# Patient Record
Sex: FEMALE | Race: BLACK OR AFRICAN AMERICAN | Employment: UNEMPLOYED | URBAN - METROPOLITAN AREA
[De-identification: names, ages, dates, MRNs, and addresses within clinical notes are randomized per-mention and may not be internally consistent; named-entity substitution may affect disease eponyms.]

---

## 2018-07-02 ENCOUNTER — HOSPITAL ENCOUNTER (OUTPATIENT)
Dept: PHYSICAL THERAPY | Age: 63
Discharge: HOME | End: 2018-07-02
Payer: MEDICAID

## 2018-07-02 PROCEDURE — 97535 SELF CARE MNGMENT TRAINING: CPT

## 2018-07-02 PROCEDURE — 97110 THERAPEUTIC EXERCISES: CPT

## 2018-07-02 PROCEDURE — 97161 PT EVAL LOW COMPLEX 20 MIN: CPT
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
## 2018-07-02 NOTE — PROGRESS NOTES
PHYSICAL THERAPY - DAILY TREATMENT NOTE    Patient Name: Karli Easton        Date: 2018  : 1955   YES Patient  Verified  Visit #:     Insurance: Payor: /      In time: 3:01 Out time: 3:50   Total Treatment Time: 52     Medicare Time Tracking (below)   Total Timed Codes (min):  na 1:1 Treatment Time:  na     TREATMENT AREA =  Right hip    SUBJECTIVE    Pain Level (on 0 to 10 scale):  10  / 10   Medication Changes/New allergies or changes in medical history, any new surgeries or procedures? NO    If yes, update Summary List   Subjective Functional Status/Changes:  []  No changes reported     See POC          OBJECTIVE    10 min Therapeutic Exercise:  [x]  See flow sheet   Rationale:      increase ROM, increase strength and improve balance to improve the patients ability to amb/ community. 10 min Self Care: Instructed in footwear, pool regulations and body's response to activity   Rationale:       reduce pain to improve the patients ability to amb/ community. Other Objective/Functional Measures:    Shown and performed HEP     Post Treatment Pain Level (on 0 to 10) scale:   10 / 10     ASSESSMENT  Assessment/Changes in Function:     See POC     []  See Progress Note/Recertification   Patient will continue to benefit from skilled PT services to modify and progress therapeutic interventions, address functional mobility deficits, address ROM deficits, address strength deficits, analyze and address soft tissue restrictions, analyze and cue movement patterns and analyze and modify body mechanics/ergonomics to attain goals per POC.    Progress toward goals / Updated goals:    See POC     PLAN  [x]  Upgrade activities as tolerated YES Continue plan of care   []  Discharge due to :    [x]  Other: Aquatic therapy to start     Therapist: Logan Whiting PT, DPT, OCS, CSCS    Date: 2018 Time: 3:02 PM

## 2018-07-17 ENCOUNTER — HOSPITAL ENCOUNTER (OUTPATIENT)
Dept: PHYSICAL THERAPY | Age: 63
End: 2018-07-17
Payer: MEDICAID

## 2018-07-24 ENCOUNTER — APPOINTMENT (OUTPATIENT)
Dept: PHYSICAL THERAPY | Age: 63
End: 2018-07-24
Payer: MEDICAID

## 2018-07-25 ENCOUNTER — APPOINTMENT (OUTPATIENT)
Dept: PHYSICAL THERAPY | Age: 63
End: 2018-07-25
Payer: MEDICAID

## 2018-07-26 ENCOUNTER — HOSPITAL ENCOUNTER (OUTPATIENT)
Dept: PHYSICAL THERAPY | Age: 63
Discharge: HOME | End: 2018-07-26
Payer: MEDICAID

## 2018-07-26 PROCEDURE — 97113 AQUATIC THERAPY/EXERCISES: CPT

## 2018-07-26 NOTE — PROGRESS NOTES
PHYSICAL THERAPY - DAILY TREATMENT NOTE - AQUATIC THERAPY    Patient Name: Jim Rizo        Date: 2018  : 1955   YES Patient  Verified  Visit #:     Insurance: Payor: [employer] / Plan: [facility] COMMUN[employer] PLAN [employer]P / Product Type: Managed Care Medicaid /      In time: 200 Out time: 230   Total Treatment Time: 30     Medicare Time Tracking (below)   Total Timed Codes (min):   1:1 Treatment Time:       TREATMENT AREA =  Right hip    SUBJECTIVE    Pain Level (on 0 to 10 scale):  7-10  / 10   Medication Changes/New allergies or changes in medical history, any new surgeries or procedures? NO If yes, update Summary List   Subjective Functional Status/Changes:  []  No changes reported     The pain is always constant and I can't sleep well at all during the night. I get most of my rest during the day time. OBJECTIVE      30 min Aquatic Therapy:  [x]  See flow sheet   Rationale:      increase ROM, increase strength, improve coordination and improve balance to improve the patients ability to perform functional mobility activities with decrease c/o symptoms. min Patient Education:  YES  Reviewed HEP   []  Progressed/Changed HEP based on: Other Objective/Functional Measures:    No change in functional measurements today. Post Treatment Pain Level (on 0 to 10) scale:   6   10     ASSESSMENT  Assessment/Changes in Function:     Overall good tolerance to all therx in pool for first treatment session  Although patient had marked \"no\" to fear of water patient demonstrates apprehension to being in the water and states that she does. tI took patient extended time to be able to move in the water. Extra time was need to perform all therx and walking in pool.      []  See Progress Note/Recertification   Patient will continue to benefit from skilled PT services to modify and progress therapeutic interventions, address functional mobility deficits, address ROM deficits, address strength deficits and analyze and cue movement patterns to attain remaining goals. Progress toward goals / Updated goals:    No change toward goals today.      PLAN  []  Upgrade activities as tolerated YES Continue plan of care   []  Discharge due to :    []  Other:      Therapist: Leatha Cole PTA    Date: 7/26/2018 Time: 7:11 AM       Future Appointments  Date Time Provider Marina Brannon   7/26/2018 2:00 PM Alysia Aguila [facility]   7/30/2018 2:30 PM Leatha Cole PTA [facility]   8/1/2018 3:00 PM Leatha Cole PTA [facility]   8/6/2018 3:00 PM Leatha Cole PTA [facility]   8/8/2018 3:00 PM Leatha Cole [facility]

## 2018-07-26 NOTE — PROGRESS NOTES
[address] [place] [address], [address] [place] [address] - Phone: [phone]  Fax: [phone]   PROGRESS NOTE    Patient Name: Jim Rizo : 1955   Treatment/Medical  Diagnosis: Right hip pain [M25.551]     Referral Source: Daina Pérez MD     Date of Initial Visit: 2018 Attended Visits: 3 Missed Visits: 0     SUMMARY OF TREATMENT  Jim Rizo has been seen at our clinic for a total of 3 visits. Pt treatment has consisted of  aquatic therapy    CURRENT STATUS  Pt has had a good tolerance to physical therapy treatment. Limited progression has been made secondary to scheduling conflicts. Goal/Measure of Progress Goal Met? 1. Decrease max pain 50-75% to assist with amb in community with cane >10 min. Status at last Eval: rates pain as 10/10 max, 7/10 at best, Current Status: 7-10/10 Slow progression   2. Improve FOTO Functional Status Score by 23 points in order to show significant functional improvement. Status at last Eval: 30 Current Status: 17 No: decrease of 13 points   3. Will rate a +5 on Global Rating of Change and be prepared to DC to HEP. Status at last Eval:  Current Status: +1 a tiny bit better, almost the same Slow progression     New Goals to be achieved in __4__  weeks:  1. Continue with above 3 goals     RECOMMENDATIONS  We would like to continue with aquatic therapy to progress patient further toward goals. Please advise. Thank you. If you have any questions/comments please contact us directly at [phone]. Thank you for allowing us to assist in the care of your patient.       LPTA Signature: Tay Sherman PTA Date: 2018   PT Signature: Anjum Yoo PT, DPT, OCS, CSCS Time: 1:51 PM     NOTE TO PHYSICIAN:  PLEASE COMPLETE THE ORDERS BELOW AND FAX TO   [facility]: [phone]  If you are unable to process this request in 24 hours please contact our office: [phone]    ___ I have read the above report and request that my patient continue as recommended.   ___ I have read the above report and request that my patient continue therapy with the following changes/special instructions:_________________________________________________________   ___ I have read the above report and request that my patient be discharged from therapy.      Physician Signature:        Date:       Time:

## 2018-07-30 ENCOUNTER — APPOINTMENT (OUTPATIENT)
Dept: PHYSICAL THERAPY | Age: 63
End: 2018-07-30
Payer: MEDICAID

## 2018-07-30 ENCOUNTER — HOSPITAL ENCOUNTER (OUTPATIENT)
Dept: PHYSICAL THERAPY | Age: 63
Discharge: HOME | End: 2018-07-30
Payer: MEDICAID

## 2018-07-30 PROCEDURE — 97113 AQUATIC THERAPY/EXERCISES: CPT

## 2018-07-30 NOTE — PROGRESS NOTES
PHYSICAL THERAPY - DAILY TREATMENT NOTE - AQUATIC THERAPY    Patient Name: Arminda         Date: 2018  : 1955   YES Patient  Verified  Visit #:   3   of   18  Insurance: Payor: Jonna Murguia / Plan: [address] [employer]P / Product Type: Managed Care Medicaid /      In time: 225 Out time: 255   Total Treatment Time: 30     Medicare Time Tracking (below)   Total Timed Codes (min):   1:1 Treatment Time:       TREATMENT AREA =  Right hip    SUBJECTIVE    Pain Level (on 0 to 10 scale):  8  / 10   Medication Changes/New allergies or changes in medical history, any new surgeries or procedures? NO If yes, update Summary List   Subjective Functional Status/Changes:  []  No changes reported     I feel on the floor this past weekend, but it was on the carpet and I didn't hurt myself. OBJECTIVE      30 min Aquatic Therapy:  [x]  See flow sheet   Rationale:      increase ROM, increase strength, improve coordination and improve balance to improve the patients ability to perform functional mobility activities with decrease c/o symptoms. min Patient Education:  YES  Reviewed HEP   []  Progressed/Changed HEP based on: Other Objective/Functional Measures: FOTO: 17 vs 30 on IE  GROC +1 a tiny bit better, almost the same. Post Treatment Pain Level (on 0 to 10) scale:   8  / 10     ASSESSMENT  Assessment/Changes in Function:     See PN       []  See Progress Note/Recertification   Patient will continue to benefit from skilled PT services to modify and progress therapeutic interventions, address functional mobility deficits, address ROM deficits, address strength deficits and analyze and cue movement patterns to attain remaining goals. Progress toward goals / Updated goals:    See PN.      PLAN  []  Upgrade activities as tolerated YES Continue plan of care   []  Discharge due to :    []  Other:      Therapist: Dona Lieberman PTA    Date: 2018 Time: 7:27 AM       Future Appointments  Date Time Provider Marina Brannon   7/30/2018 2:30 PM Rere Angel [facility]   8/1/2018 3:00 PM Amber Angel [facility]   8/6/2018 3:00 PM Amber Angel [facility]   8/8/2018 3:00 PM Amber Angel [facility]

## 2018-08-01 ENCOUNTER — APPOINTMENT (OUTPATIENT)
Dept: PHYSICAL THERAPY | Age: 63
End: 2018-08-01
Payer: MEDICAID

## 2018-08-06 ENCOUNTER — HOSPITAL ENCOUNTER (OUTPATIENT)
Dept: PHYSICAL THERAPY | Age: 63
End: 2018-08-06
Payer: MEDICAID

## 2018-08-06 ENCOUNTER — APPOINTMENT (OUTPATIENT)
Dept: PHYSICAL THERAPY | Age: 63
End: 2018-08-06
Payer: MEDICAID

## 2018-08-08 ENCOUNTER — APPOINTMENT (OUTPATIENT)
Dept: PHYSICAL THERAPY | Age: 63
End: 2018-08-08
Payer: MEDICAID

## 2018-08-08 ENCOUNTER — HOSPITAL ENCOUNTER (OUTPATIENT)
Dept: PHYSICAL THERAPY | Age: 63
Discharge: HOME | End: 2018-08-08
Payer: MEDICAID

## 2018-08-08 PROCEDURE — 97113 AQUATIC THERAPY/EXERCISES: CPT

## 2018-08-08 NOTE — PROGRESS NOTES
PHYSICAL THERAPY - DAILY TREATMENT NOTE - AQUATIC THERAPY    Patient Name: Karli Easton        Date: 2018  : 1955   YES Patient  Verified  Visit #:     Insurance: Payor: Juanjo Plan / Plan: [address] PLAN [employer]P / Product Type: Managed Care Medicaid /      In time: 300 Out time: 330   Total Treatment Time: 30     Medicare Time Tracking (below)   Total Timed Codes (min):   1:1 Treatment Time:       TREATMENT AREA =  Right hip    SUBJECTIVE    Pain Level (on 0 to 10 scale):  10  / 10   Medication Changes/New allergies or changes in medical history, any new surgeries or procedures? NO If yes, update Summary List   Subjective Functional Status/Changes:  []  No changes reported     I got a dr appointment on the  with dr Aretha Pelaez. The pain has just been bad. I been tryin to do what I can with exercises. OBJECTIVE      30 min Aquatic Therapy:  [x]  See flow sheet   Rationale:      increase ROM, increase strength, improve coordination and improve balance to improve the patients ability to perform functional mobility activities with decrease c/o symptoms.       min Patient Education:  YES  Reviewed HEP   []  Progressed/Changed HEP based on: Other Objective/Functional Measures:    Unable to perform sl balance ro step up secondary to increase hip pain today. Add leg circles for ROM. Post Treatment Pain Level (on 0 to 10) scale:  8 / 10     ASSESSMENT  Assessment/Changes in Function:     Patient demonstrates a significantly compromised gait today. Reports significant increase of hip pain and unable to place full weight onto (R) side secondary to causing an increase of pain.      []  See Progress Note/Recertification   Patient will continue to benefit from skilled PT services to modify and progress therapeutic interventions, address functional mobility deficits, address ROM deficits, address strength deficits and analyze and cue movement patterns to attain remaining goals.   Progress toward goals / Updated goals:    Recommendation has been made for patient to return to MD at this time secondary to having no relief of symptoms.        PLAN  []  Upgrade activities as tolerated YES Continue plan of care   []  Discharge due to :    []  Other:      Therapist: Jeff Ricci PTA    Date: 8/8/2018 Time: 6:59 AM       Future Appointments  Date Time Provider Marina Brannon   8/8/2018 3:00 PM Jeff Ricci PTA [address]

## 2018-08-09 NOTE — PROGRESS NOTES
[address] [place] [address], [address] [place] [address] - Phone: [phone]  Fax: [phone]   DISCHARGE NOTE    Patient Name: Sameer Jean : 1955   Treatment/Medical  Diagnosis: Right hip pain [M25.551]     Referral Source: Jazmyn Posada MD     Date of Initial Visit: 2018 Attended Visits: 4 Missed Visits: 0     SUMMARY OF TREATMENT  Sameer Jean has been seen at our clinic for a total of 4 visits. Pt treatment has consisted of  aquatic therapy    CURRENT STATUS  Pt has had fair tolerance to physical therapy treatment. Patient continued to present with significant pain in (R) hip (8-10/10). Patient also demonstrated a significant loss of her ability to ambulate with a normal gait pattern secondary to increase pain with weight bearing through (R) LE. Therapeutic exercises in the pool was giving no apparent relief of pain and there was little to no carry over to relieve her symptoms. Patient was also unable to perform HEP secondary to increase pain reported when she would attempt to perform them. Patient has been advised to return to MD for follow up and for further consultation. Goal/Measure of Progress Goal Met? 1. Decrease max pain 50-75% to assist with amb in community with cane >10 min. Status at last Eval: rates pain as 10/10 max, 7/10 at best, Current Status: 7-10/10 Slow progression   2. Improve FOTO Functional Status Score by 23 points in order to show significant functional improvement. Status at last Eval: 30 Current Status: 17 No: decrease of 13 points   3. Will rate a +5 on Global Rating of Change and be prepared to DC to HEP. Status at last Eval:  Current Status: +1 a tiny bit better, almost the same Slow progression       RECOMMENDATIONS  Patient to be discharged due to minimal progress and poor tolerance and to return to MD for further consultation. Thank you.     If you have any questions/comments please contact us directly at [phone]. Thank you for allowing us to assist in the care of your patient. LPTA Signature: Noe Henry PTA Date: 8/9/2018   PT Signature: Rm Winn PT, DPT, OCS, CSCS Time: 6:50 am     NOTE TO PHYSICIAN:  PLEASE COMPLETE THE ORDERS BELOW AND FAX TO   [facility]: [phone]  If you are unable to process this request in 24 hours please contact our office: [phone]    ___ I have read the above report and request that my patient continue as recommended.   ___ I have read the above report and request that my patient continue therapy with the following changes/special instructions:_________________________________________________________   ___ I have read the above report and request that my patient be discharged from therapy.      Physician Signature:        Date:       Time:

## 2019-08-15 ENCOUNTER — OFFICE VISIT (OUTPATIENT)
Dept: ORTHOPEDIC SURGERY | Facility: CLINIC | Age: 64
End: 2019-08-15

## 2019-08-15 VITALS
WEIGHT: 203.4 LBS | TEMPERATURE: 97 F | SYSTOLIC BLOOD PRESSURE: 133 MMHG | HEIGHT: 68 IN | RESPIRATION RATE: 18 BRPM | OXYGEN SATURATION: 97 % | HEART RATE: 95 BPM | DIASTOLIC BLOOD PRESSURE: 81 MMHG | BODY MASS INDEX: 30.83 KG/M2

## 2019-08-15 DIAGNOSIS — M25.551 RIGHT HIP PAIN: ICD-10-CM

## 2019-08-15 DIAGNOSIS — M19.90 INFLAMMATORY ARTHROPATHY: ICD-10-CM

## 2019-08-15 DIAGNOSIS — M16.11 PRIMARY OSTEOARTHRITIS OF RIGHT HIP: ICD-10-CM

## 2019-08-15 DIAGNOSIS — M16.0 PRIMARY OSTEOARTHRITIS OF BOTH HIPS: Primary | ICD-10-CM

## 2019-08-15 DIAGNOSIS — M17.12 PATELLOFEMORAL ARTHRITIS OF LEFT KNEE: ICD-10-CM

## 2019-08-15 RX ORDER — AMLODIPINE BESYLATE 5 MG/1
5 TABLET ORAL DAILY
COMMUNITY

## 2019-08-15 RX ORDER — LEVOTHYROXINE SODIUM 150 UG/1
150 TABLET ORAL
COMMUNITY
Start: 2019-07-22

## 2019-08-15 RX ORDER — DICLOFENAC SODIUM 75 MG/1
TABLET, DELAYED RELEASE ORAL
COMMUNITY
Start: 2019-08-09 | End: 2020-02-06

## 2019-08-15 RX ORDER — GLUCOSAM/CHONDRO/HERB 149/HYAL 750-100 MG
1 TABLET ORAL DAILY
COMMUNITY
End: 2020-02-06

## 2019-08-15 RX ORDER — LOSARTAN POTASSIUM AND HYDROCHLOROTHIAZIDE 12.5; 1 MG/1; MG/1
1 TABLET ORAL DAILY
COMMUNITY
Start: 2019-08-09

## 2019-08-15 RX ORDER — GLUCOSAMINE/CHONDR SU A SOD 750-600 MG
TABLET ORAL
COMMUNITY

## 2019-08-15 NOTE — PROGRESS NOTES
Patient: Davis Hernández                MRN: [id number]       SSN: [id number]  YOB: 1955        AGE: 66 y.o. SEX: female  Body mass index is 30.93 kg/m². PCP: Unknown, Provider  08/15/19    HISTORY:  Thank you so much for having me see the patient for opinion and advice in regard to her hips. She is a very nice lady who has very severe end-stage arthritis involving the hips, associated with some dysplasia. The right is much worse than the left. They have been bad for about 10 years. She was looking after a family member for the last few years and she is interested in possible surgery. The pain is moderate and aching. She has difficulty putting shoes and socks on. She is on a walker full time. She has difficulty getting in and out of the car. Activities of daily living are very restricted, and has a less than a 5 minute level walking tolerance. She denies fevers, chills, night sweats or weight loss. She does have some morning stiffness. A 12-point review of systems was performed today and all other systems are reviewed and are otherwise negative. PHYSICAL EXAMINATION:  On examination, she walks poorly. She also has arthritis involving the left knee. She is in varus. She has an antalgic and Trendelenburg gait bilateral, worse on the right than on the left. The left hip has about 10 degrees of internal rotation, and within that range is not too sore. The right hip really has no internal rotation, and, in fact, has a couple degree of external rotation contracture. It is very stiff which reproduces her groin and thigh discomfort. Sensation is grossly intact. Just slight neuropathy distally. No foot drop. Tibialis anterior and EHL are 5/5. Calves nontender. Homans sign is negative. No peripheral edema, cyanosis or clubbing. RADIOGRAPHS:   Review of the x-rays confirm very severe end-stage arthritis, especially of the right hip with some dysplasia.       PLAN: We had a lengthy discussion regarding risks and benefits involving total joint replacement. They are going to think about things. I would be very happy to reconstruct them. Complications   including, but not limited to, infection, DVT, pulmonary embolism, anesthetic complications, blood loss requiring transfusion, leg length discrepancy, the right leg will be longer temporarily and will eventually get the left hip repaired to even things out, dislocation, implant longevity. We also discussed material selections as well. It has been a pleasure to share in her care. They are a very nice family. We will see her back in a couple weeks' time. I would like to get some rheumatological labs. She does have a fair bit of arthritis at the MCP joints as well. CC:   Family Medicine           Dr. Minna Goltz (sp)         REVIEW OF SYSTEMS:      CON: negative for weight loss, fever  EYE: negative for double vision  ENT: negative for hoarseness  RS:   negative for Tb  GI:    negative for blood in stool  :  negative for blood in urine  Other systems reviewed and noted below. Past Medical History:   Diagnosis Date    Arthritis     Hypertension     Thyroid disease        History reviewed. No pertinent family history. Current Outpatient Medications   Medication Sig Dispense Refill    diclofenac EC (VOLTAREN) 75 mg EC tablet       levothyroxine (SYNTHROID) 150 mcg tablet       losartan-hydroCHLOROthiazide (HYZAAR) 100-12.5 mg per tablet       amLODIPine (NORVASC) 5 mg tablet Take 5 mg by mouth daily.  Biotin 2,500 mcg cap Take  by mouth.  omega 3-DHA-EPA-fish oil 1,000 mg (120 mg-180 mg) capsule Take 1 Cap by mouth daily. Allergies   Allergen Reactions    Aspirin Nausea and Vomiting    Other Medication Not Reported This Time     Spiam       History reviewed. No pertinent surgical history.     Social History     Socioeconomic History    Marital status: UNKNOWN     Spouse name: Not on file  Number of children: Not on file    Years of education: Not on file    Highest education level: Not on file   Occupational History    Not on file   Social Needs    Financial resource strain: Not on file    Food insecurity:     Worry: Not on file     Inability: Not on file    Transportation needs:     Medical: Not on file     Non-medical: Not on file   Tobacco Use    Smoking status: Never Smoker    Smokeless tobacco: Never Used   Substance and Sexual Activity    Alcohol use: Not Currently    Drug use: Never    Sexual activity: Not on file   Lifestyle    Physical activity:     Days per week: Not on file     Minutes per session: Not on file    Stress: Not on file   Relationships    Social connections:     Talks on phone: Not on file     Gets together: Not on file     Attends Yazidi service: Not on file     Active member of club or organization: Not on file     Attends meetings of clubs or organizations: Not on file     Relationship status: Not on file    Intimate partner violence:     Fear of current or ex partner: Not on file     Emotionally abused: Not on file     Physically abused: Not on file     Forced sexual activity: Not on file   Other Topics Concern    Not on file   Social History Narrative    Not on file       Visit Vitals  /81   Pulse 95   Temp 97 °F (36.1 °C) (Oral)   Resp 18   Ht 5' 8\" (1.727 m)   Wt 203 lb 6.4 oz (92.3 kg)   SpO2 97%   BMI 30.93 kg/m²         PHYSICAL EXAMINATION:  GENERAL: Alert and oriented x3, in no acute distress, well-developed, well-nourished, afebrile. HEART: No JVD. EYES: No scleral icterus   NECK: No significant lymphadenopathy   LUNGS: No respiratory compromise or indrawing  ABDOMEN: Soft, non-tender, non-distended. Electronically signed by:  Lucia Martins MD

## 2019-08-29 DIAGNOSIS — M19.90 INFLAMMATORY ARTHROPATHY: ICD-10-CM

## 2019-09-11 DIAGNOSIS — M19.90 INFLAMMATORY ARTHROPATHY: ICD-10-CM

## 2019-09-18 ENCOUNTER — OFFICE VISIT (OUTPATIENT)
Dept: ORTHOPEDIC SURGERY | Facility: CLINIC | Age: 64
End: 2019-09-18

## 2019-09-18 VITALS
SYSTOLIC BLOOD PRESSURE: 130 MMHG | HEIGHT: 68 IN | BODY MASS INDEX: 31.07 KG/M2 | TEMPERATURE: 96.3 F | OXYGEN SATURATION: 98 % | WEIGHT: 205 LBS | RESPIRATION RATE: 16 BRPM | DIASTOLIC BLOOD PRESSURE: 88 MMHG | HEART RATE: 85 BPM

## 2019-09-18 DIAGNOSIS — M16.12 PRIMARY OSTEOARTHRITIS OF LEFT HIP: ICD-10-CM

## 2019-09-18 DIAGNOSIS — M16.31 UNILATERAL OSTEOARTHRITIS RESULTING FROM HIP DYSPLASIA, RIGHT HIP: Primary | ICD-10-CM

## 2019-09-18 DIAGNOSIS — R70.0 ELEVATED SED RATE: ICD-10-CM

## 2019-09-18 NOTE — PROGRESS NOTES
Patient: Shannen James                MRN: [id number]       SSN: [id number]  YOB: 1955        AGE: 66 y.o. SEX: female  Body mass index is 31.17 kg/m². PCP: Unknown, Provider  09/18/19    HISTORY:  I had the pleasure of reviewing the patient with her son today with regard to severe arthritis of both hips, worse on the right than on the left. She has significant dysplasia and we will plan on having a modular implant for this. She is fed up and frustrated with it. The pain is significant. She does get some morning stiffness. We did do some rheumatological labs. The Lyme disease is negative. We also tested for OSMEL and rheumatoid factors and all were within normal limits. CRP is negative, however, her sed rate is quite elevated at 82. I will get a rheumatological opinion. She may have seronegative arthropathy. I will get an opinion from [facility] and Dr. Mookie Garcia as well. She is really fed up and frustrated with it and would like to have her hip replaced. We had discussed less invasive surgery versus standard _______________ surgery, and I think, given her size and her significant dysplasia we should plan on a more formal approach. I think she will do well. We are planning on an overnight stay in the hospital, at most.  We had a lengthy discussion regarding risks and benefits involving surgery, including, but not limited to, infection, DVT, pulmonary embolism, anesthetic complications, blood loss requiring transfusion, leg length discrepancy because she needs her other hip done, dislocation, and I think we may plan on using a modular implant because of her significant dysplasia of the right hip. On the 12-point review of systems which is positive for morning stiffness. She is otherwise a fairly healthy lady. All pertinent positives noted. All other systems are reviewed and are negative.       PHYSICAL EXAMINATION:  On examination today, she has only a couple of degrees of internal rotation, especially of the right hip. The left hip is similar but less severe. Calf is nontender. Homans sign is negative. Mild leg length discrepancy. No foot drop. Tibialis anterior and EHL 5/5. RADIOGRAPHS:   Review of the x-rays confirm end-stage arthritis, really of both hips with significant dysplasia on the right side. PLAN:  I would be very happy to replace the hip for her. We had a lengthy discussion regarding all of the risks and benefits. I think she will do nicely. She has been limping for a long time so I would expect some muscle weakness after surgery. Having said this, I think she will do very well. We will plan on extended therapy for her. CC:  Family Medicine        REVIEW OF SYSTEMS:      CON: negative for weight loss, fever  EYE: negative for double vision  ENT: negative for hoarseness  RS:   negative for Tb  GI:    negative for blood in stool  :  negative for blood in urine  Other systems reviewed and noted below. Past Medical History:   Diagnosis Date    Arthritis     Hypertension     Thyroid disease        History reviewed. No pertinent family history. Current Outpatient Medications   Medication Sig Dispense Refill    diclofenac EC (VOLTAREN) 75 mg EC tablet       levothyroxine (SYNTHROID) 150 mcg tablet       losartan-hydroCHLOROthiazide (HYZAAR) 100-12.5 mg per tablet       amLODIPine (NORVASC) 5 mg tablet Take 5 mg by mouth daily.  Biotin 2,500 mcg cap Take  by mouth.  omega 3-DHA-EPA-fish oil 1,000 mg (120 mg-180 mg) capsule Take 1 Cap by mouth daily. Allergies   Allergen Reactions    Aspirin Nausea and Vomiting    Other Medication Not Reported This Time     Spiam       History reviewed. No pertinent surgical history.     Social History     Socioeconomic History    Marital status: UNKNOWN     Spouse name: Not on file    Number of children: Not on file    Years of education: Not on file    Highest education level: Not on file   Occupational History    Not on file   Social Needs    Financial resource strain: Not on file    Food insecurity:     Worry: Not on file     Inability: Not on file    Transportation needs:     Medical: Not on file     Non-medical: Not on file   Tobacco Use    Smoking status: Never Smoker    Smokeless tobacco: Never Used   Substance and Sexual Activity    Alcohol use: Not Currently    Drug use: Never    Sexual activity: Not on file   Lifestyle    Physical activity:     Days per week: Not on file     Minutes per session: Not on file    Stress: Not on file   Relationships    Social connections:     Talks on phone: Not on file     Gets together: Not on file     Attends Sabianism service: Not on file     Active member of club or organization: Not on file     Attends meetings of clubs or organizations: Not on file     Relationship status: Not on file    Intimate partner violence:     Fear of current or ex partner: Not on file     Emotionally abused: Not on file     Physically abused: Not on file     Forced sexual activity: Not on file   Other Topics Concern    Not on file   Social History Narrative    Not on file       Visit Vitals  /88 (BP 1 Location: Left arm, BP Patient Position: Sitting)   Pulse 85   Temp 96.3 °F (35.7 °C) (Oral)   Resp 16   Ht 5' 8\" (1.727 m)   Wt 205 lb (93 kg)   SpO2 98%   BMI 31.17 kg/m²         PHYSICAL EXAMINATION:  GENERAL: Alert and oriented x3, in no acute distress, well-developed, well-nourished, afebrile. HEART: No JVD. EYES: No scleral icterus   NECK: No significant lymphadenopathy   LUNGS: No respiratory compromise or indrawing  ABDOMEN: Soft, non-tender, non-distended. Electronically signed by:  Mychal Olmedo MD

## 2019-09-18 NOTE — PROGRESS NOTES
1. Have you been to the ER, urgent care clinic since your last visit? Hospitalized since your last visit? NO    2. Have you seen or consulted any other health care providers outside of the [address] [place] [address] since your last visit? Include any pap smears or colon screening.  NO

## 2019-10-17 DIAGNOSIS — M19.90 INFLAMMATORY ARTHROPATHY: ICD-10-CM

## 2019-12-27 ENCOUNTER — TELEPHONE (OUTPATIENT)
Dept: ORTHOPEDIC SURGERY | Age: 64
End: 2019-12-27

## 2019-12-27 DIAGNOSIS — M16.11 PRIMARY OSTEOARTHRITIS OF RIGHT HIP: Primary | ICD-10-CM

## 2019-12-27 DIAGNOSIS — Z01.818 PRE-OP EXAM: ICD-10-CM

## 2019-12-27 NOTE — TELEPHONE ENCOUNTER
Patient to have Rt ALFREDO on 1/31/20. States she has all DME needed except for 3-in-1-commode. Please put in order and Shirin Colmenares will fax out to [facility].

## 2020-01-17 DIAGNOSIS — M16.11 PRIMARY OSTEOARTHRITIS OF RIGHT HIP: ICD-10-CM

## 2020-01-17 DIAGNOSIS — Z01.818 PREOP EXAMINATION: Primary | ICD-10-CM

## 2020-01-20 ENCOUNTER — HOSPITAL ENCOUNTER (OUTPATIENT)
Dept: PREADMISSION TESTING | Age: 65
Discharge: HOME | End: 2020-01-20
Payer: MEDICAID

## 2020-01-20 ENCOUNTER — HOSPITAL ENCOUNTER (OUTPATIENT)
Dept: GENERAL RADIOLOGY | Age: 65
Discharge: HOME | End: 2020-01-20
Payer: MEDICAID

## 2020-01-20 DIAGNOSIS — Z01.818 PREOP EXAMINATION: ICD-10-CM

## 2020-01-20 DIAGNOSIS — M16.11 PRIMARY OSTEOARTHRITIS OF RIGHT HIP: ICD-10-CM

## 2020-01-20 LAB
ABO + RH BLD: NORMAL
ALBUMIN SERPL-MCNC: 3.9 G/DL (ref 3.4–5)
ANION GAP SERPL CALC-SCNC: 5 MMOL/L (ref 3–18)
APPEARANCE UR: NORMAL
APTT PPP: 33.9 SEC (ref 23–36.4)
ATRIAL RATE: 92 BPM
BASOPHILS # BLD: 0 K/UL (ref 0–0.1)
BASOPHILS NFR BLD: 1 % (ref 0–2)
BILIRUB UR QL: NEGATIVE
BLOOD GROUP ANTIBODIES SERPL: NORMAL
BUN SERPL-MCNC: 16 MG/DL (ref 7–18)
BUN/CREAT SERPL: 16 (ref 12–20)
CALCIUM SERPL-MCNC: 9.4 MG/DL (ref 8.5–10.1)
CALCULATED P AXIS, ECG09: 25 DEGREES
CALCULATED R AXIS, ECG10: 24 DEGREES
CALCULATED T AXIS, ECG11: 68 DEGREES
CHLORIDE SERPL-SCNC: 106 MMOL/L (ref 100–111)
CO2 SERPL-SCNC: 31 MMOL/L (ref 21–32)
COLOR UR: YELLOW
CREAT SERPL-MCNC: 0.98 MG/DL (ref 0.6–1.3)
DIAGNOSIS, 93000: NORMAL
DIFFERENTIAL METHOD BLD: ABNORMAL
EOSINOPHIL # BLD: 0.2 K/UL (ref 0–0.4)
EOSINOPHIL NFR BLD: 3 % (ref 0–5)
ERYTHROCYTE [DISTWIDTH] IN BLOOD BY AUTOMATED COUNT: 13.5 % (ref 11.6–14.5)
EST. AVERAGE GLUCOSE BLD GHB EST-MCNC: 117 MG/DL
GLUCOSE SERPL-MCNC: 95 MG/DL (ref 74–99)
GLUCOSE UR STRIP.AUTO-MCNC: NEGATIVE MG/DL
HBA1C MFR BLD: 5.7 % (ref 4.2–5.6)
HCT VFR BLD AUTO: 38.7 % (ref 35–45)
HGB BLD-MCNC: 13.2 G/DL (ref 12–16)
HGB UR QL STRIP: NEGATIVE
INR PPP: 0.9 (ref 0.8–1.2)
KETONES UR QL STRIP.AUTO: NEGATIVE MG/DL
LEUKOCYTE ESTERASE UR QL STRIP.AUTO: NEGATIVE
LYMPHOCYTES # BLD: 1.6 K/UL (ref 0.9–3.6)
LYMPHOCYTES NFR BLD: 24 % (ref 21–52)
MCH RBC QN AUTO: 29.6 PG (ref 24–34)
MCHC RBC AUTO-ENTMCNC: 34.1 G/DL (ref 31–37)
MCV RBC AUTO: 86.8 FL (ref 74–97)
MONOCYTES # BLD: 0.4 K/UL (ref 0.05–1.2)
MONOCYTES NFR BLD: 6 % (ref 3–10)
NEUTS SEG # BLD: 4.4 K/UL (ref 1.8–8)
NEUTS SEG NFR BLD: 66 % (ref 40–73)
NITRITE UR QL STRIP.AUTO: NEGATIVE
P-R INTERVAL, ECG05: 118 MS
PH UR STRIP: 5 [PH] (ref 5–8)
PLATELET # BLD AUTO: 275 K/UL (ref 135–420)
PMV BLD AUTO: 12 FL (ref 9.2–11.8)
POTASSIUM SERPL-SCNC: 4.2 MMOL/L (ref 3.5–5.5)
PROT UR STRIP-MCNC: NEGATIVE MG/DL
PROTHROMBIN TIME: 11.7 SEC (ref 11.5–15.2)
Q-T INTERVAL, ECG07: 346 MS
QRS DURATION, ECG06: 66 MS
QTC CALCULATION (BEZET), ECG08: 427 MS
RBC # BLD AUTO: 4.46 M/UL (ref 4.2–5.3)
SODIUM SERPL-SCNC: 142 MMOL/L (ref 136–145)
SP GR UR REFRACTOMETRY: 1.01 (ref 1–1.03)
SPECIMEN EXP DATE BLD: NORMAL
UROBILINOGEN UR QL STRIP.AUTO: 0.2 EU/DL (ref 0.2–1)
VENTRICULAR RATE, ECG03: 92 BPM
WBC # BLD AUTO: 6.7 K/UL (ref 4.6–13.2)

## 2020-01-20 PROCEDURE — 85025 COMPLETE CBC W/AUTO DIFF WBC: CPT

## 2020-01-20 PROCEDURE — 93005 ELECTROCARDIOGRAM TRACING: CPT

## 2020-01-20 PROCEDURE — 85610 PROTHROMBIN TIME: CPT

## 2020-01-20 PROCEDURE — 80048 BASIC METABOLIC PNL TOTAL CA: CPT

## 2020-01-20 PROCEDURE — 83036 HEMOGLOBIN GLYCOSYLATED A1C: CPT

## 2020-01-20 PROCEDURE — 86900 BLOOD TYPING SEROLOGIC ABO: CPT

## 2020-01-20 PROCEDURE — 87086 URINE CULTURE/COLONY COUNT: CPT

## 2020-01-20 PROCEDURE — 82040 ASSAY OF SERUM ALBUMIN: CPT

## 2020-01-20 PROCEDURE — 71046 X-RAY EXAM CHEST 2 VIEWS: CPT

## 2020-01-20 PROCEDURE — 36415 COLL VENOUS BLD VENIPUNCTURE: CPT

## 2020-01-20 PROCEDURE — 85730 THROMBOPLASTIN TIME PARTIAL: CPT

## 2020-01-20 PROCEDURE — 81003 URINALYSIS AUTO W/O SCOPE: CPT

## 2020-01-21 ENCOUNTER — OFFICE VISIT (OUTPATIENT)
Dept: ORTHOPEDIC SURGERY | Facility: CLINIC | Age: 65
End: 2020-01-21

## 2020-01-21 VITALS
SYSTOLIC BLOOD PRESSURE: 118 MMHG | RESPIRATION RATE: 16 BRPM | HEART RATE: 105 BPM | DIASTOLIC BLOOD PRESSURE: 84 MMHG | TEMPERATURE: 96.5 F | OXYGEN SATURATION: 97 % | WEIGHT: 208 LBS | HEIGHT: 66 IN | BODY MASS INDEX: 33.43 KG/M2

## 2020-01-21 DIAGNOSIS — K44.9 HERNIA, HIATAL: ICD-10-CM

## 2020-01-21 DIAGNOSIS — M16.11 PRIMARY OSTEOARTHRITIS OF RIGHT HIP: ICD-10-CM

## 2020-01-21 DIAGNOSIS — Z01.818 PRE-OP EXAM: Primary | ICD-10-CM

## 2020-01-21 LAB
BACTERIA SPEC CULT: NORMAL
SERVICE CMNT-IMP: NORMAL

## 2020-01-21 RX ORDER — CELECOXIB 100 MG/1
400 CAPSULE ORAL ONCE
Status: CANCELLED | OUTPATIENT
Start: 2020-01-21 | End: 2020-01-21

## 2020-01-21 RX ORDER — PREGABALIN 25 MG/1
75 CAPSULE ORAL ONCE
Status: CANCELLED | OUTPATIENT
Start: 2020-01-21 | End: 2020-01-21

## 2020-01-21 RX ORDER — ACETAMINOPHEN 325 MG/1
1000 TABLET ORAL ONCE
Status: CANCELLED | OUTPATIENT
Start: 2020-01-21 | End: 2020-01-21

## 2020-01-21 RX ORDER — WARFARIN 1 MG/1
10 TABLET ORAL ONCE
Status: CANCELLED | OUTPATIENT
Start: 2020-01-21 | End: 2020-01-21

## 2020-01-21 NOTE — H&P
[address] [facility], [address] [facility]  [phone]           HISTORY & PHYSICAL      Patient: Nancy Stratton                MRN: [id number]       SSN: [id number]  YOB: 1955        AGE: 60 y.o. SEX: female  Body mass index is 33.57 kg/m². PCP: Unknown, Provider  01/21/20      CC: right hip end stage OA  Problem List Items Addressed This Visit     None      Visit Diagnoses     Pre-op exam    -  Primary    Relevant Orders    AMB POC X-RAY RADEX HIP UNI WITH PELVIS MIN 4 VIEWS (Completed)    Primary osteoarthritis of right hip        Hernia, hiatal                HPI:  The patient is a pleasant 60 y.o. whom has end stage OA of their Right hip and has failed conservative treatment including but not limited to NSAIDS, cortisone injections, viscosupplementation, PT, and pain medicine. Due to the current findings and affected activities of daily living, surgical intervention is indicated. The alternatives, risks, complications, as well as expected outcome were discussed. These include but are not limited to infection, blood loss, need for blood transfusion, neurovascular damage, DVT, PE,  post-op stiffness and pain, leg length discrepancy, dislocation, anesthetic complications, prothesis longevity, need for more surgery, MI, stroke, and even death. The patient understands and wishes to proceed with surgery. Past Medical History:   Diagnosis Date    Arthritis     Fibromyalgia     Hypertension     Thyroid disease          Current Outpatient Medications:     diclofenac EC (VOLTAREN) 75 mg EC tablet, , Disp: , Rfl:     levothyroxine (SYNTHROID) 150 mcg tablet, , Disp: , Rfl:     losartan-hydroCHLOROthiazide (HYZAAR) 100-12.5 mg per tablet, , Disp: , Rfl:     amLODIPine (NORVASC) 5 mg tablet, Take 5 mg by mouth daily. , Disp: , Rfl:     Biotin 2,500 mcg cap, Take  by mouth., Disp: , Rfl:     omega 3-DHA-EPA-fish oil 1,000 mg (120 mg-180 mg) capsule, Take 1 Cap by mouth daily. , Disp: , Rfl:     Allergies   Allergen Reactions    Aspirin Nausea and Vomiting    Other Medication Not Reported This Time     Spiam       Social History     Socioeconomic History    Marital status: UNKNOWN     Spouse name: Not on file    Number of children: Not on file    Years of education: Not on file    Highest education level: Not on file   Occupational History    Not on file   Social Needs    Financial resource strain: Not on file    Food insecurity:     Worry: Not on file     Inability: Not on file    Transportation needs:     Medical: Not on file     Non-medical: Not on file   Tobacco Use    Smoking status: Never Smoker    Smokeless tobacco: Never Used   Substance and Sexual Activity    Alcohol use: Not Currently    Drug use: Never    Sexual activity: Not on file   Lifestyle    Physical activity:     Days per week: Not on file     Minutes per session: Not on file    Stress: Not on file   Relationships    Social connections:     Talks on phone: Not on file     Gets together: Not on file     Attends Yarsani service: Not on file     Active member of club or organization: Not on file     Attends meetings of clubs or organizations: Not on file     Relationship status: Not on file    Intimate partner violence:     Fear of current or ex partner: Not on file     Emotionally abused: Not on file     Physically abused: Not on file     Forced sexual activity: Not on file   Other Topics Concern    Not on file   Social History Narrative    Not on file       Past Surgical History:   Procedure Laterality Date    HX HYSTERECTOMY      partial    HX THYROIDECTOMY         Family History:  Non-contributory.      PE:  Visit Vitals  /84   Pulse (!) 105   Temp 96.5 °F (35.8 °C) (Oral)   Resp 16   Ht 5' 6\" (1.676 m)   Wt 208 lb (94.3 kg)   SpO2 97%   BMI 33.57 kg/m²     A&O X3, NAD, well develop, well nourished  Heart: S1-S2, rrr  Lungs: CTA bilat  Abd: soft, nt, nt, + bs in all quadrants  Ext:  Pos distal pulses to DP, PT  Leg lengths show the right LE to be longer grossly sitting in the chair, this was explained to the patient. X-ray: right hip shows end stage OA    Labs: labs were reviewed and wnl.  ua neg            EKG abnormal- previous infarct- referred to cardiology    A:  Right  hip end stage OA    P:  At this point we will move forward with surgery. Again, the alternatives, risks, complications, as well as expected outcome were discussed and the patient wishes to proceed with surgery. Pt has been instructed to stop aspirin, nsaids, rheumatologic medications and blood thinners. They have also been instructed to continue on any heart and bp meds and to take them the morning of surgery with sips of water. Lateral approach  The patient will require in-patient admission. Admission as an in-patient is reasonable and necessary due to increased risk of surgery due to the factors indicated as well as the possible need for prolonged in-hospital or skilled post-acute care in order to improve this patient's functional ability.         Jose Luis King

## 2020-01-21 NOTE — PROGRESS NOTES
1. Have you been to the ER, urgent care clinic since your last visit? Hospitalized since your last visit? No    2. Have you seen or consulted any other health care providers outside of the [address] [place] [address] since your last visit? Include any pap smears or colon screening.  No

## 2020-01-24 ENCOUNTER — OFFICE VISIT (OUTPATIENT)
Dept: CARDIOLOGY CLINIC | Age: 65
End: 2020-01-24

## 2020-01-24 VITALS
SYSTOLIC BLOOD PRESSURE: 132 MMHG | HEIGHT: 66 IN | BODY MASS INDEX: 34.07 KG/M2 | DIASTOLIC BLOOD PRESSURE: 81 MMHG | WEIGHT: 212 LBS | OXYGEN SATURATION: 97 % | TEMPERATURE: 97.6 F | HEART RATE: 82 BPM

## 2020-01-24 DIAGNOSIS — I10 ESSENTIAL HYPERTENSION: ICD-10-CM

## 2020-01-24 DIAGNOSIS — E03.9 ACQUIRED HYPOTHYROIDISM: ICD-10-CM

## 2020-01-24 DIAGNOSIS — Z01.818 PRE-OP EXAMINATION: Primary | ICD-10-CM

## 2020-01-24 DIAGNOSIS — M79.7 FIBROMYALGIA: ICD-10-CM

## 2020-01-24 NOTE — PROGRESS NOTES
HISTORY OF PRESENT ILLNESS  Phyllis Hoover is a 60 y.o. female. Patient referred for pre-op evaluation. She is anticipating right hip replacement surgery with Dr. Leona Judd on 1/31/2020. New Patient   The history is provided by the patient and medical records. This is a chronic problem. Pertinent negatives include no chest pain and no shortness of breath. Hypertension   The history is provided by the patient and medical records. This is a chronic problem. The problem has not changed since onset. Pertinent negatives include no chest pain and no shortness of breath. Nothing aggravates the symptoms. The symptoms are relieved by medications. Review of Systems   Constitutional: Negative for malaise/fatigue. Respiratory: Negative for cough, shortness of breath and wheezing. Cardiovascular: Negative for chest pain, palpitations, orthopnea, claudication, leg swelling and PND. Gastrointestinal: Negative for nausea and vomiting. Musculoskeletal: Positive for joint pain. Negative for falls. Neurological: Negative for dizziness. Endo/Heme/Allergies: Does not bruise/bleed easily. Physical Exam   Constitutional: She is oriented to person, place, and time. She appears well-developed and well-nourished. Neck: No JVD present. Cardiovascular: Normal rate, regular rhythm, normal heart sounds and intact distal pulses. Exam reveals no gallop and no friction rub. No murmur heard. Pulmonary/Chest: Effort normal and breath sounds normal. No respiratory distress. She has no wheezes. She has no rales. She exhibits no tenderness. Abdominal: Soft. She exhibits no distension and no mass. There is no tenderness. Musculoskeletal: Normal range of motion. General: No edema. Neurological: She is alert and oriented to person, place, and time. Skin: Skin is warm and dry. Psychiatric: She has a normal mood and affect. ASSESSMENT and PLAN    Ms. Mcconnell has a reminder for a \"due or due soon\" health maintenance. I have asked that she contact her primary care provider for follow-up on this health maintenance. I have personally reviewed patients ekg done at other facility. I Have personally reviewed recent relevant labs available and discussed with patient    No flowsheet data found. Assessment       ICD-10-CM ICD-9-CM    1. Pre-op examination Z01.818 V72.84    2. Essential hypertension I10 401.9    3. Acquired hypothyroidism E03.9 244.9    4. Fibromyalgia M79.7 729.1      1/2020  Referred for pre-op evaluation. She is anticipating right hip replacement surgery on 1/31/2020. EKG reviewed SR, no ischemic changes. She has no cardiac history or symptoms. No further cardiac testing required. She may proceed with surgery with low cardiac risk. There are no discontinued medications. No orders of the defined types were placed in this encounter. Follow-up and Dispositions    · Return if symptoms worsen or fail to improve. I have independently evaluated and examined the patient. All relevant labs and testing data's are reviewed. Care plan discussed and updated after review.     Nixon He MD

## 2020-01-24 NOTE — PATIENT INSTRUCTIONS

## 2020-02-03 NOTE — PROGRESS NOTES
Geraldine Hernandez has decided with their surgeon to have a joint replacement to improve mobility and decrease pain. Joint Replacement Pre-Operative class was attended . Topics discussed included surgery preparation, what to expect the day of surgery, medications (to include a multimodal approach to pain control and limiting narcotics), nutrition, glycemic control, respiratory therapy, physical and occupational therapy, and discharge planning. Discussed the importance of using these alternative pain management methods with the goal of using less opioid use after surgery and at home. A patient education notebook was provided and the opportunity was given to ask questions. The phone number of the [facility]  was provided for any future questions or concerns.

## 2020-02-04 ENCOUNTER — ANESTHESIA EVENT (OUTPATIENT)
Dept: SURGERY | Age: 65
DRG: 301 | End: 2020-02-04
Payer: MEDICAID

## 2020-02-05 ENCOUNTER — APPOINTMENT (OUTPATIENT)
Dept: GENERAL RADIOLOGY | Age: 65
DRG: 301 | End: 2020-02-05
Attending: ORTHOPAEDIC SURGERY
Payer: MEDICAID

## 2020-02-05 ENCOUNTER — ANESTHESIA (OUTPATIENT)
Dept: SURGERY | Age: 65
DRG: 301 | End: 2020-02-05
Payer: MEDICAID

## 2020-02-05 ENCOUNTER — HOSPITAL ENCOUNTER (INPATIENT)
Age: 65
LOS: 1 days | Discharge: HOME HEALTH | DRG: 301 | End: 2020-02-06
Attending: ORTHOPAEDIC SURGERY | Admitting: ORTHOPAEDIC SURGERY
Payer: MEDICAID

## 2020-02-05 DIAGNOSIS — M16.10 HIP ARTHRITIS: Primary | ICD-10-CM

## 2020-02-05 PROBLEM — K44.9 HIATAL HERNIA: Status: ACTIVE | Noted: 2020-02-05

## 2020-02-05 PROBLEM — I10 HYPERTENSION: Status: ACTIVE | Noted: 2020-02-05

## 2020-02-05 PROBLEM — E03.9 HYPOTHYROIDISM: Status: ACTIVE | Noted: 2020-02-05

## 2020-02-05 PROBLEM — Q65.89 HIP DYSPLASIA: Status: ACTIVE | Noted: 2020-02-05

## 2020-02-05 LAB
ABO + RH BLD: NORMAL
BLOOD GROUP ANTIBODIES SERPL: NORMAL
INR PPP: 1 (ref 0.8–1.2)
PROTHROMBIN TIME: 12.5 SEC (ref 11.5–15.2)
SPECIMEN EXP DATE BLD: NORMAL

## 2020-02-05 PROCEDURE — C1776 JOINT DEVICE (IMPLANTABLE): HCPCS | Performed by: ORTHOPAEDIC SURGERY

## 2020-02-05 PROCEDURE — C9290 INJ, BUPIVACAINE LIPOSOME: HCPCS | Performed by: ORTHOPAEDIC SURGERY

## 2020-02-05 PROCEDURE — 77030040922 HC BLNKT HYPOTHRM STRY -A: Performed by: ORTHOPAEDIC SURGERY

## 2020-02-05 PROCEDURE — 77030020298 HC PLUG ACET APEX J&J -C: Performed by: ORTHOPAEDIC SURGERY

## 2020-02-05 PROCEDURE — 74011000250 HC RX REV CODE- 250: Performed by: ORTHOPAEDIC SURGERY

## 2020-02-05 PROCEDURE — 77030003666 HC NDL SPINAL BD -A: Performed by: ORTHOPAEDIC SURGERY

## 2020-02-05 PROCEDURE — 97162 PT EVAL MOD COMPLEX 30 MIN: CPT

## 2020-02-05 PROCEDURE — 74011000258 HC RX REV CODE- 258: Performed by: ORTHOPAEDIC SURGERY

## 2020-02-05 PROCEDURE — 74011250637 HC RX REV CODE- 250/637: Performed by: NURSE ANESTHETIST, CERTIFIED REGISTERED

## 2020-02-05 PROCEDURE — 65270000029 HC RM PRIVATE

## 2020-02-05 PROCEDURE — 74011250636 HC RX REV CODE- 250/636: Performed by: NURSE ANESTHETIST, CERTIFIED REGISTERED

## 2020-02-05 PROCEDURE — 77030020294 HC ABD PLLW HIP DERY -B: Performed by: ORTHOPAEDIC SURGERY

## 2020-02-05 PROCEDURE — 77030008462 HC STPLR SKN PROX J&J -A: Performed by: ORTHOPAEDIC SURGERY

## 2020-02-05 PROCEDURE — 77030034967 HC GRFT DBM PLS PST ALLOFUS 3CC ALLO- D: Performed by: ORTHOPAEDIC SURGERY

## 2020-02-05 PROCEDURE — 88311 DECALCIFY TISSUE: CPT

## 2020-02-05 PROCEDURE — 77030003029 HC SUT VCRL J&J -B: Performed by: ORTHOPAEDIC SURGERY

## 2020-02-05 PROCEDURE — 77030031139 HC SUT VCRL2 J&J -A: Performed by: ORTHOPAEDIC SURGERY

## 2020-02-05 PROCEDURE — 76210000006 HC OR PH I REC 0.5 TO 1 HR: Performed by: ORTHOPAEDIC SURGERY

## 2020-02-05 PROCEDURE — 74011000250 HC RX REV CODE- 250: Performed by: NURSE ANESTHETIST, CERTIFIED REGISTERED

## 2020-02-05 PROCEDURE — 76010000171 HC OR TIME 2 TO 2.5 HR INTENSV-TIER 1: Performed by: ORTHOPAEDIC SURGERY

## 2020-02-05 PROCEDURE — C1713 ANCHOR/SCREW BN/BN,TIS/BN: HCPCS | Performed by: ORTHOPAEDIC SURGERY

## 2020-02-05 PROCEDURE — 97530 THERAPEUTIC ACTIVITIES: CPT

## 2020-02-05 PROCEDURE — 0QR40JZ REPLACEMENT OF RIGHT ACETABULUM WITH SYNTHETIC SUBSTITUTE, OPEN APPROACH: ICD-10-PCS | Performed by: ORTHOPAEDIC SURGERY

## 2020-02-05 PROCEDURE — 74011250637 HC RX REV CODE- 250/637: Performed by: ORTHOPAEDIC SURGERY

## 2020-02-05 PROCEDURE — 74011250636 HC RX REV CODE- 250/636: Performed by: ORTHOPAEDIC SURGERY

## 2020-02-05 PROCEDURE — 0SR903A REPLACEMENT OF RIGHT HIP JOINT WITH CERAMIC SYNTHETIC SUBSTITUTE, UNCEMENTED, OPEN APPROACH: ICD-10-PCS | Performed by: ORTHOPAEDIC SURGERY

## 2020-02-05 PROCEDURE — 77030012935 HC DRSG AQUACEL BMS -B: Performed by: ORTHOPAEDIC SURGERY

## 2020-02-05 PROCEDURE — 77010033678 HC OXYGEN DAILY

## 2020-02-05 PROCEDURE — 77030018835 HC SOL IRR LR ICUM -A: Performed by: ORTHOPAEDIC SURGERY

## 2020-02-05 PROCEDURE — 77030019557 HC ELECTRD VES SEAL MEDT -F: Performed by: ORTHOPAEDIC SURGERY

## 2020-02-05 PROCEDURE — 74011250636 HC RX REV CODE- 250/636: Performed by: ANESTHESIOLOGY

## 2020-02-05 PROCEDURE — 77030002933 HC SUT MCRYL J&J -A: Performed by: ORTHOPAEDIC SURGERY

## 2020-02-05 PROCEDURE — 76010000131 HC OR TIME 2 TO 2.5 HR: Performed by: ORTHOPAEDIC SURGERY

## 2020-02-05 PROCEDURE — 73502 X-RAY EXAM HIP UNI 2-3 VIEWS: CPT

## 2020-02-05 PROCEDURE — 74011250636 HC RX REV CODE- 250/636: Performed by: PHYSICIAN ASSISTANT

## 2020-02-05 PROCEDURE — 74011000250 HC RX REV CODE- 250: Performed by: PHYSICIAN ASSISTANT

## 2020-02-05 PROCEDURE — 64520 N BLOCK LUMBAR/THORACIC: CPT | Performed by: ANESTHESIOLOGY

## 2020-02-05 PROCEDURE — 74011250637 HC RX REV CODE- 250/637: Performed by: PHYSICIAN ASSISTANT

## 2020-02-05 PROCEDURE — 77030027138 HC INCENT SPIROMETER -A: Performed by: ORTHOPAEDIC SURGERY

## 2020-02-05 PROCEDURE — 77030013708 HC HNDPC SUC IRR PULS STRY –B: Performed by: ORTHOPAEDIC SURGERY

## 2020-02-05 PROCEDURE — 74011000258 HC RX REV CODE- 258: Performed by: PHYSICIAN ASSISTANT

## 2020-02-05 PROCEDURE — 77030018836 HC SOL IRR NACL ICUM -A: Performed by: ORTHOPAEDIC SURGERY

## 2020-02-05 PROCEDURE — 76060000035 HC ANESTHESIA 2 TO 2.5 HR: Performed by: ORTHOPAEDIC SURGERY

## 2020-02-05 PROCEDURE — 85610 PROTHROMBIN TIME: CPT

## 2020-02-05 PROCEDURE — 86900 BLOOD TYPING SEROLOGIC ABO: CPT

## 2020-02-05 PROCEDURE — 77030006835 HC BLD SAW SAG STRY -B: Performed by: ORTHOPAEDIC SURGERY

## 2020-02-05 PROCEDURE — 88304 TISSUE EXAM BY PATHOLOGIST: CPT

## 2020-02-05 DEVICE — COMPONENT TOT HIP PRIMARY CERM ALTRX: Type: IMPLANTABLE DEVICE | Site: HIP | Status: FUNCTIONAL

## 2020-02-05 DEVICE — IMPLANTABLE DEVICE: Type: IMPLANTABLE DEVICE | Site: HIP | Status: FUNCTIONAL

## 2020-02-05 DEVICE — ELIMINATOR H APEX FOR 48-60MM PINN HIP SHELL: Type: IMPLANTABLE DEVICE | Site: HIP | Status: FUNCTIONAL

## 2020-02-05 DEVICE — SCREW BNE L25MM DIA6.5MM CANC HIP S STL GRIPTION FULL THRD: Type: IMPLANTABLE DEVICE | Site: HIP | Status: FUNCTIONAL

## 2020-02-05 DEVICE — LINER ACET OD52MM ID36MM +4MM OFFSET HIP POLYETH MTL ON: Type: IMPLANTABLE DEVICE | Site: HIP | Status: FUNCTIONAL

## 2020-02-05 DEVICE — GRAFT BONE PASTE DEMINERLIZED BONE MTRX 3CC ALLOFUSE +: Type: IMPLANTABLE DEVICE | Site: HIP | Status: FUNCTIONAL

## 2020-02-05 DEVICE — SCREW BNE L20MM DIA6.5MM CANC HIP S STL GRIPTION FULL THRD: Type: IMPLANTABLE DEVICE | Site: HIP | Status: FUNCTIONAL

## 2020-02-05 RX ORDER — WARFARIN 10 MG/1
10 TABLET ORAL ONCE
Status: COMPLETED | OUTPATIENT
Start: 2020-02-05 | End: 2020-02-05

## 2020-02-05 RX ORDER — ASPIRIN 325 MG
325 TABLET, DELAYED RELEASE (ENTERIC COATED) ORAL 2 TIMES DAILY
Status: DISCONTINUED | OUTPATIENT
Start: 2020-02-06 | End: 2020-02-06

## 2020-02-05 RX ORDER — SODIUM CHLORIDE 0.9 % (FLUSH) 0.9 %
5-40 SYRINGE (ML) INJECTION AS NEEDED
Status: DISCONTINUED | OUTPATIENT
Start: 2020-02-05 | End: 2020-02-05 | Stop reason: HOSPADM

## 2020-02-05 RX ORDER — LIDOCAINE HYDROCHLORIDE 20 MG/ML
INJECTION, SOLUTION EPIDURAL; INFILTRATION; INTRACAUDAL; PERINEURAL AS NEEDED
Status: DISCONTINUED | OUTPATIENT
Start: 2020-02-05 | End: 2020-02-05 | Stop reason: HOSPADM

## 2020-02-05 RX ORDER — AMLODIPINE BESYLATE 5 MG/1
5 TABLET ORAL DAILY
Status: DISCONTINUED | OUTPATIENT
Start: 2020-02-06 | End: 2020-02-06 | Stop reason: HOSPADM

## 2020-02-05 RX ORDER — GLYCOPYRROLATE 0.2 MG/ML
INJECTION INTRAMUSCULAR; INTRAVENOUS AS NEEDED
Status: DISCONTINUED | OUTPATIENT
Start: 2020-02-05 | End: 2020-02-05 | Stop reason: HOSPADM

## 2020-02-05 RX ORDER — LIDOCAINE HYDROCHLORIDE 10 MG/ML
0.1 INJECTION, SOLUTION EPIDURAL; INFILTRATION; INTRACAUDAL; PERINEURAL AS NEEDED
Status: DISCONTINUED | OUTPATIENT
Start: 2020-02-05 | End: 2020-02-05 | Stop reason: HOSPADM

## 2020-02-05 RX ORDER — MIDAZOLAM HYDROCHLORIDE 1 MG/ML
2 INJECTION, SOLUTION INTRAMUSCULAR; INTRAVENOUS
Status: DISCONTINUED | OUTPATIENT
Start: 2020-02-05 | End: 2020-02-05 | Stop reason: HOSPADM

## 2020-02-05 RX ORDER — SODIUM CHLORIDE, SODIUM LACTATE, POTASSIUM CHLORIDE, CALCIUM CHLORIDE 600; 310; 30; 20 MG/100ML; MG/100ML; MG/100ML; MG/100ML
50 INJECTION, SOLUTION INTRAVENOUS CONTINUOUS
Status: DISCONTINUED | OUTPATIENT
Start: 2020-02-05 | End: 2020-02-05 | Stop reason: HOSPADM

## 2020-02-05 RX ORDER — WARFARIN 3 MG/1
3 TABLET ORAL DAILY
Qty: 30 TAB | Refills: 1 | Status: SHIPPED | OUTPATIENT
Start: 2020-02-05 | End: 2020-02-26

## 2020-02-05 RX ORDER — ZOLPIDEM TARTRATE 5 MG/1
5 TABLET ORAL
Status: DISCONTINUED | OUTPATIENT
Start: 2020-02-05 | End: 2020-02-06 | Stop reason: HOSPADM

## 2020-02-05 RX ORDER — LEVOTHYROXINE SODIUM 150 UG/1
150 TABLET ORAL
Status: DISCONTINUED | OUTPATIENT
Start: 2020-02-06 | End: 2020-02-06 | Stop reason: HOSPADM

## 2020-02-05 RX ORDER — ACETAMINOPHEN 500 MG
1000 TABLET ORAL ONCE
Status: COMPLETED | OUTPATIENT
Start: 2020-02-05 | End: 2020-02-05

## 2020-02-05 RX ORDER — MAGNESIUM SULFATE 100 %
4 CRYSTALS MISCELLANEOUS AS NEEDED
Status: DISCONTINUED | OUTPATIENT
Start: 2020-02-05 | End: 2020-02-05 | Stop reason: HOSPADM

## 2020-02-05 RX ORDER — CELECOXIB 400 MG/1
400 CAPSULE ORAL ONCE
Status: COMPLETED | OUTPATIENT
Start: 2020-02-05 | End: 2020-02-05

## 2020-02-05 RX ORDER — OXYCODONE AND ACETAMINOPHEN 7.5; 325 MG/1; MG/1
1-2 TABLET ORAL
Qty: 56 TAB | Refills: 0 | Status: SHIPPED | OUTPATIENT
Start: 2020-02-05 | End: 2020-02-12

## 2020-02-05 RX ORDER — ROPIVACAINE HYDROCHLORIDE 2 MG/ML
30 INJECTION, SOLUTION EPIDURAL; INFILTRATION; PERINEURAL
Status: COMPLETED | OUTPATIENT
Start: 2020-02-05 | End: 2020-02-05

## 2020-02-05 RX ORDER — SODIUM CHLORIDE 0.9 % (FLUSH) 0.9 %
5-40 SYRINGE (ML) INJECTION EVERY 8 HOURS
Status: DISCONTINUED | OUTPATIENT
Start: 2020-02-05 | End: 2020-02-06 | Stop reason: HOSPADM

## 2020-02-05 RX ORDER — SODIUM CHLORIDE, SODIUM LACTATE, POTASSIUM CHLORIDE, CALCIUM CHLORIDE 600; 310; 30; 20 MG/100ML; MG/100ML; MG/100ML; MG/100ML
75 INJECTION, SOLUTION INTRAVENOUS CONTINUOUS
Status: DISCONTINUED | OUTPATIENT
Start: 2020-02-05 | End: 2020-02-05 | Stop reason: HOSPADM

## 2020-02-05 RX ORDER — HYDROCHLOROTHIAZIDE 12.5 MG/1
12.5 CAPSULE ORAL DAILY
Status: DISCONTINUED | OUTPATIENT
Start: 2020-02-06 | End: 2020-02-06 | Stop reason: HOSPADM

## 2020-02-05 RX ORDER — HYDROMORPHONE HYDROCHLORIDE 2 MG/ML
0.5 INJECTION, SOLUTION INTRAMUSCULAR; INTRAVENOUS; SUBCUTANEOUS AS NEEDED
Status: DISCONTINUED | OUTPATIENT
Start: 2020-02-05 | End: 2020-02-05 | Stop reason: HOSPADM

## 2020-02-05 RX ORDER — AMOXICILLIN 250 MG
1 CAPSULE ORAL 2 TIMES DAILY
Status: DISCONTINUED | OUTPATIENT
Start: 2020-02-05 | End: 2020-02-06 | Stop reason: HOSPADM

## 2020-02-05 RX ORDER — FENTANYL CITRATE 50 UG/ML
100 INJECTION, SOLUTION INTRAMUSCULAR; INTRAVENOUS
Status: DISCONTINUED | OUTPATIENT
Start: 2020-02-05 | End: 2020-02-05 | Stop reason: HOSPADM

## 2020-02-05 RX ORDER — PROPOFOL 10 MG/ML
INJECTION, EMULSION INTRAVENOUS AS NEEDED
Status: DISCONTINUED | OUTPATIENT
Start: 2020-02-05 | End: 2020-02-05 | Stop reason: HOSPADM

## 2020-02-05 RX ORDER — CELECOXIB 100 MG/1
200 CAPSULE ORAL 2 TIMES DAILY
Status: DISCONTINUED | OUTPATIENT
Start: 2020-02-05 | End: 2020-02-06 | Stop reason: HOSPADM

## 2020-02-05 RX ORDER — POVIDONE-IODINE 10 %
SOLUTION, NON-ORAL TOPICAL AS NEEDED
Status: DISCONTINUED | OUTPATIENT
Start: 2020-02-05 | End: 2020-02-05 | Stop reason: HOSPADM

## 2020-02-05 RX ORDER — VANCOMYCIN HYDROCHLORIDE 1 G/20ML
INJECTION, POWDER, LYOPHILIZED, FOR SOLUTION INTRAVENOUS AS NEEDED
Status: DISCONTINUED | OUTPATIENT
Start: 2020-02-05 | End: 2020-02-05 | Stop reason: HOSPADM

## 2020-02-05 RX ORDER — PREGABALIN 75 MG/1
75 CAPSULE ORAL ONCE
Status: COMPLETED | OUTPATIENT
Start: 2020-02-05 | End: 2020-02-05

## 2020-02-05 RX ORDER — SODIUM CHLORIDE 0.9 % (FLUSH) 0.9 %
5-40 SYRINGE (ML) INJECTION AS NEEDED
Status: DISCONTINUED | OUTPATIENT
Start: 2020-02-05 | End: 2020-02-06 | Stop reason: HOSPADM

## 2020-02-05 RX ORDER — LOSARTAN POTASSIUM 50 MG/1
100 TABLET ORAL DAILY
Status: DISCONTINUED | OUTPATIENT
Start: 2020-02-06 | End: 2020-02-06 | Stop reason: HOSPADM

## 2020-02-05 RX ORDER — FENTANYL CITRATE 50 UG/ML
INJECTION, SOLUTION INTRAMUSCULAR; INTRAVENOUS AS NEEDED
Status: DISCONTINUED | OUTPATIENT
Start: 2020-02-05 | End: 2020-02-05 | Stop reason: HOSPADM

## 2020-02-05 RX ORDER — ONDANSETRON 2 MG/ML
4 INJECTION INTRAMUSCULAR; INTRAVENOUS
Status: DISCONTINUED | OUTPATIENT
Start: 2020-02-05 | End: 2020-02-06 | Stop reason: HOSPADM

## 2020-02-05 RX ORDER — ROPIVACAINE HYDROCHLORIDE 2 MG/ML
INJECTION, SOLUTION EPIDURAL; INFILTRATION; PERINEURAL
Status: COMPLETED | OUTPATIENT
Start: 2020-02-05 | End: 2020-02-05

## 2020-02-05 RX ORDER — FLUMAZENIL 0.1 MG/ML
0.2 INJECTION INTRAVENOUS AS NEEDED
Status: DISCONTINUED | OUTPATIENT
Start: 2020-02-05 | End: 2020-02-06 | Stop reason: HOSPADM

## 2020-02-05 RX ORDER — CEPHALEXIN 500 MG/1
500 CAPSULE ORAL 4 TIMES DAILY
Qty: 8 CAP | Refills: 0 | Status: SHIPPED | OUTPATIENT
Start: 2020-02-05 | End: 2022-02-23 | Stop reason: ALTCHOICE

## 2020-02-05 RX ORDER — OXYCODONE HYDROCHLORIDE 5 MG/1
10-15 TABLET ORAL
Status: DISCONTINUED | OUTPATIENT
Start: 2020-02-05 | End: 2020-02-06 | Stop reason: HOSPADM

## 2020-02-05 RX ORDER — FAMOTIDINE 20 MG/1
20 TABLET, FILM COATED ORAL ONCE
Status: COMPLETED | OUTPATIENT
Start: 2020-02-05 | End: 2020-02-05

## 2020-02-05 RX ORDER — DOCUSATE SODIUM 100 MG/1
100 CAPSULE, LIQUID FILLED ORAL 2 TIMES DAILY
Qty: 60 CAP | Refills: 2 | Status: SHIPPED | OUTPATIENT
Start: 2020-02-05 | End: 2020-05-05

## 2020-02-05 RX ORDER — CEFAZOLIN SODIUM 2 G/50ML
2 SOLUTION INTRAVENOUS
Status: COMPLETED | OUTPATIENT
Start: 2020-02-05 | End: 2020-02-05

## 2020-02-05 RX ORDER — NEOSTIGMINE METHYLSULFATE 1 MG/ML
INJECTION, SOLUTION INTRAVENOUS AS NEEDED
Status: DISCONTINUED | OUTPATIENT
Start: 2020-02-05 | End: 2020-02-05 | Stop reason: HOSPADM

## 2020-02-05 RX ORDER — SODIUM CHLORIDE 0.9 % (FLUSH) 0.9 %
5-40 SYRINGE (ML) INJECTION EVERY 8 HOURS
Status: DISCONTINUED | OUTPATIENT
Start: 2020-02-05 | End: 2020-02-05 | Stop reason: HOSPADM

## 2020-02-05 RX ORDER — NALOXONE HYDROCHLORIDE 0.4 MG/ML
0.4 INJECTION, SOLUTION INTRAMUSCULAR; INTRAVENOUS; SUBCUTANEOUS AS NEEDED
Status: DISCONTINUED | OUTPATIENT
Start: 2020-02-05 | End: 2020-02-06 | Stop reason: HOSPADM

## 2020-02-05 RX ORDER — SODIUM CHLORIDE 9 MG/ML
100 INJECTION, SOLUTION INTRAVENOUS CONTINUOUS
Status: DISCONTINUED | OUTPATIENT
Start: 2020-02-05 | End: 2020-02-06 | Stop reason: HOSPADM

## 2020-02-05 RX ORDER — ACETAMINOPHEN 500 MG
1000 TABLET ORAL EVERY 6 HOURS
Status: DISCONTINUED | OUTPATIENT
Start: 2020-02-05 | End: 2020-02-06 | Stop reason: HOSPADM

## 2020-02-05 RX ORDER — ROCURONIUM BROMIDE 10 MG/ML
INJECTION, SOLUTION INTRAVENOUS AS NEEDED
Status: DISCONTINUED | OUTPATIENT
Start: 2020-02-05 | End: 2020-02-05 | Stop reason: HOSPADM

## 2020-02-05 RX ORDER — POLYMYXIN B 500000 [USP'U]/1
INJECTION, POWDER, LYOPHILIZED, FOR SOLUTION INTRAMUSCULAR; INTRATHECAL; INTRAVENOUS; OPHTHALMIC AS NEEDED
Status: DISCONTINUED | OUTPATIENT
Start: 2020-02-05 | End: 2020-02-05 | Stop reason: HOSPADM

## 2020-02-05 RX ORDER — PHENYLEPHRINE HCL IN 0.9% NACL 1 MG/10 ML
SYRINGE (ML) INTRAVENOUS AS NEEDED
Status: DISCONTINUED | OUTPATIENT
Start: 2020-02-05 | End: 2020-02-05 | Stop reason: HOSPADM

## 2020-02-05 RX ORDER — PREGABALIN 25 MG/1
25 CAPSULE ORAL 2 TIMES DAILY
Status: DISCONTINUED | OUTPATIENT
Start: 2020-02-05 | End: 2020-02-06 | Stop reason: HOSPADM

## 2020-02-05 RX ORDER — ONDANSETRON 2 MG/ML
4 INJECTION INTRAMUSCULAR; INTRAVENOUS ONCE
Status: COMPLETED | OUTPATIENT
Start: 2020-02-05 | End: 2020-02-05

## 2020-02-05 RX ORDER — DEXTROSE 50 % IN WATER (D50W) INTRAVENOUS SYRINGE
25-50 AS NEEDED
Status: DISCONTINUED | OUTPATIENT
Start: 2020-02-05 | End: 2020-02-05 | Stop reason: HOSPADM

## 2020-02-05 RX ORDER — ONDANSETRON 2 MG/ML
INJECTION INTRAMUSCULAR; INTRAVENOUS AS NEEDED
Status: DISCONTINUED | OUTPATIENT
Start: 2020-02-05 | End: 2020-02-05 | Stop reason: HOSPADM

## 2020-02-05 RX ORDER — LANOLIN ALCOHOL/MO/W.PET/CERES
1 CREAM (GRAM) TOPICAL 2 TIMES DAILY WITH MEALS
Status: DISCONTINUED | OUTPATIENT
Start: 2020-02-05 | End: 2020-02-06 | Stop reason: HOSPADM

## 2020-02-05 RX ORDER — SUCCINYLCHOLINE CHLORIDE 20 MG/ML
INJECTION INTRAMUSCULAR; INTRAVENOUS AS NEEDED
Status: DISCONTINUED | OUTPATIENT
Start: 2020-02-05 | End: 2020-02-05 | Stop reason: HOSPADM

## 2020-02-05 RX ADMIN — ROPIVACAINE HYDROCHLORIDE 60 MG: 2 INJECTION, SOLUTION EPIDURAL; INFILTRATION at 11:06

## 2020-02-05 RX ADMIN — SUCCINYLCHOLINE CHLORIDE 160 MG: 20 INJECTION, SOLUTION INTRAMUSCULAR; INTRAVENOUS at 11:26

## 2020-02-05 RX ADMIN — ONDANSETRON 4 MG: 2 INJECTION INTRAMUSCULAR; INTRAVENOUS at 12:41

## 2020-02-05 RX ADMIN — SODIUM CHLORIDE 100 ML/HR: 900 INJECTION, SOLUTION INTRAVENOUS at 17:29

## 2020-02-05 RX ADMIN — SENNOSIDES AND DOCUSATE SODIUM 1 TABLET: 8.6; 5 TABLET ORAL at 18:42

## 2020-02-05 RX ADMIN — ROCURONIUM BROMIDE 15 MG: 10 INJECTION, SOLUTION INTRAVENOUS at 12:16

## 2020-02-05 RX ADMIN — WARFARIN SODIUM 10 MG: 10 TABLET ORAL at 09:56

## 2020-02-05 RX ADMIN — FENTANYL CITRATE 50 MCG: 50 INJECTION INTRAMUSCULAR; INTRAVENOUS at 13:48

## 2020-02-05 RX ADMIN — LIDOCAINE HYDROCHLORIDE 80 MG: 20 INJECTION, SOLUTION EPIDURAL; INFILTRATION; INTRACAUDAL; PERINEURAL at 11:26

## 2020-02-05 RX ADMIN — ROCURONIUM BROMIDE 35 MG: 10 INJECTION, SOLUTION INTRAVENOUS at 11:33

## 2020-02-05 RX ADMIN — PREGABALIN 75 MG: 75 CAPSULE ORAL at 09:57

## 2020-02-05 RX ADMIN — FERROUS SULFATE TAB 325 MG (65 MG ELEMENTAL FE) 325 MG: 325 (65 FE) TAB at 18:43

## 2020-02-05 RX ADMIN — LIDOCAINE HYDROCHLORIDE 2 ML: 10 INJECTION, SOLUTION EPIDURAL; INFILTRATION; INTRACAUDAL; PERINEURAL at 11:04

## 2020-02-05 RX ADMIN — ROCURONIUM BROMIDE 5 MG: 10 INJECTION, SOLUTION INTRAVENOUS at 11:26

## 2020-02-05 RX ADMIN — CELECOXIB 200 MG: 100 CAPSULE ORAL at 18:42

## 2020-02-05 RX ADMIN — SODIUM CHLORIDE, SODIUM LACTATE, POTASSIUM CHLORIDE, AND CALCIUM CHLORIDE: 600; 310; 30; 20 INJECTION, SOLUTION INTRAVENOUS at 12:15

## 2020-02-05 RX ADMIN — PROPOFOL 150 MG: 10 INJECTION, EMULSION INTRAVENOUS at 11:26

## 2020-02-05 RX ADMIN — TRANEXAMIC ACID 1 G: 1 INJECTION, SOLUTION INTRAVENOUS at 11:39

## 2020-02-05 RX ADMIN — SODIUM CHLORIDE, SODIUM LACTATE, POTASSIUM CHLORIDE, AND CALCIUM CHLORIDE 50 ML/HR: 600; 310; 30; 20 INJECTION, SOLUTION INTRAVENOUS at 09:10

## 2020-02-05 RX ADMIN — CEFAZOLIN 2 G: 10 INJECTION, POWDER, FOR SOLUTION INTRAVENOUS at 11:38

## 2020-02-05 RX ADMIN — Medication 100 MCG: at 12:09

## 2020-02-05 RX ADMIN — Medication 100 MCG: at 11:33

## 2020-02-05 RX ADMIN — Medication 200 MCG: at 11:42

## 2020-02-05 RX ADMIN — GLYCOPYRROLATE 0.4 MG: 0.2 INJECTION INTRAMUSCULAR; INTRAVENOUS at 13:08

## 2020-02-05 RX ADMIN — Medication 100 MCG: at 11:38

## 2020-02-05 RX ADMIN — ROPIVACAINE HYDROCHLORIDE 50 MG: 2 INJECTION, SOLUTION EPIDURAL; INFILTRATION at 11:07

## 2020-02-05 RX ADMIN — TRANEXAMIC ACID 1 G: 1 INJECTION, SOLUTION INTRAVENOUS at 13:07

## 2020-02-05 RX ADMIN — ACETAMINOPHEN 1000 MG: 500 TABLET ORAL at 09:56

## 2020-02-05 RX ADMIN — CEFAZOLIN 2 G: 10 INJECTION, POWDER, FOR SOLUTION INTRAVENOUS at 11:19

## 2020-02-05 RX ADMIN — FAMOTIDINE 20 MG: 20 TABLET ORAL at 09:56

## 2020-02-05 RX ADMIN — Medication 100 MCG: at 12:22

## 2020-02-05 RX ADMIN — Medication 100 MCG: at 12:57

## 2020-02-05 RX ADMIN — Medication 10 ML: at 19:01

## 2020-02-05 RX ADMIN — ROCURONIUM BROMIDE 10 MG: 10 INJECTION, SOLUTION INTRAVENOUS at 11:49

## 2020-02-05 RX ADMIN — FENTANYL CITRATE 50 MCG: 50 INJECTION INTRAMUSCULAR; INTRAVENOUS at 13:09

## 2020-02-05 RX ADMIN — PREGABALIN 25 MG: 25 CAPSULE ORAL at 18:48

## 2020-02-05 RX ADMIN — Medication 3 MG: at 13:08

## 2020-02-05 RX ADMIN — ONDANSETRON 4 MG: 2 INJECTION INTRAMUSCULAR; INTRAVENOUS at 14:08

## 2020-02-05 RX ADMIN — Medication 100 MCG: at 13:08

## 2020-02-05 RX ADMIN — CELECOXIB 400 MG: 400 CAPSULE ORAL at 09:56

## 2020-02-05 RX ADMIN — HYDROMORPHONE HYDROCHLORIDE 0.5 MG: 2 INJECTION, SOLUTION INTRAMUSCULAR; INTRAVENOUS; SUBCUTANEOUS at 14:13

## 2020-02-05 RX ADMIN — Medication 100 MCG: at 12:37

## 2020-02-05 RX ADMIN — ACETAMINOPHEN 1000 MG: 500 TABLET ORAL at 18:42

## 2020-02-05 RX ADMIN — MIDAZOLAM 2 MG: 1 INJECTION INTRAMUSCULAR; INTRAVENOUS at 11:01

## 2020-02-05 RX ADMIN — Medication 100 MCG: at 11:57

## 2020-02-05 RX ADMIN — FENTANYL CITRATE 100 MCG: 50 INJECTION, SOLUTION INTRAMUSCULAR; INTRAVENOUS at 11:01

## 2020-02-05 NOTE — INTERVAL H&P NOTE
H&P Update:  Garcia Lopes was seen and examined. History and physical has been reviewed. The patient has been examined.  There have been no significant clinical changes since the completion of the originally dated History and Physical.

## 2020-02-05 NOTE — ANESTHESIA POSTPROCEDURE EVALUATION
Procedure(s):  RIGHT TOTAL HIP ARTHROPLASTY LATERAL APPROACH. general, regional    Anesthesia Post Evaluation      Multimodal analgesia: multimodal analgesia used between 6 hours prior to anesthesia start to PACU discharge  Patient location during evaluation: bedside  Patient participation: complete - patient participated  Level of consciousness: awake  Pain management: adequate  Airway patency: patent  Anesthetic complications: no  Cardiovascular status: stable  Respiratory status: acceptable  Hydration status: acceptable  Post anesthesia nausea and vomiting:  controlled      Vitals Value Taken Time   /69 2/5/2020  2:21 PM   Temp 36.4 °C (97.5 °F) 2/5/2020  2:21 PM   Pulse 57 2/5/2020  2:23 PM   Resp 11 2/5/2020  2:23 PM   SpO2 90 % 2/5/2020  2:23 PM   Vitals shown include unvalidated device data.

## 2020-02-05 NOTE — DISCHARGE SUMMARY
2/5/2020  8:22 AM    2/6/2020, 1:35 PM    Primary Dx:right Orthopedic / Rheumatologic: Total Hip Replacement  Secondary Dx: Etiological Diagnoses: none    HPI:  Pt has end stage OA and had failed conservative treatment. Due to the current findings and affected activity of daily living surgical intervention is indicated.   The alternatives, risks, complications as well as expected outcome were discussed, the patient understands and wishes to proceed with surgery    Past Medical History:   Diagnosis Date    Arthritis     Fibromyalgia     Hypertension     Thyroid disease          Current Facility-Administered Medications:     lidocaine (PF) (XYLOCAINE) 10 mg/mL (1 %) injection 0.1 mL, 0.1 mL, SubCUTAneous, PRN, Sandie Bailey, CRNA, 2 mL at 02/05/20 1104    lactated Ringers infusion, 50 mL/hr, IntraVENous, CONTINUOUS, Sandie Lyn CRNA, Last Rate: 50 mL/hr at 02/05/20 0910    fentaNYL citrate (PF) injection 100 mcg, 100 mcg, IntraVENous, Multiple, Isra Lyn CRNA, 100 mcg at 02/05/20 1101    midazolam (VERSED) injection 2 mg, 2 mg, IntraVENous, Multiple, Caras, Isra A, CRNA, 2 mg at 02/05/20 1101    bupivacaine liposome (PF) susp (EXPAREL) infiltration 266 mg, 20 mL, Infiltration, ONCE, Ervin Andrew PA-C    bupivacaine (PF) 0.5 % (5 mg/mL) 25 mL, EPINEPHrine HCl (PF) 0.5 mg, ketorolac 30 mg, bupivacaine liposome (PF) susp 20 mL in 0.9% sodium chloride 50 mL iv solution, , , PRN, Luca Aleman MD, 96.5 mL at 02/05/20 1226    vancomycin (VANCOCIN) injection, , , PRN, Luca Aleman MD, 3 g at 02/05/20 1210    sodium chloride 0.9 % bolus infusion, , , CONTINUOUS, Dagoberto Baez MD, 500 mL at 02/05/20 1210    polymyxin B 500,000 unit injection, , , PRN, Luca Aleman MD, 750,000 Units at 02/05/20 1211    povidone-iodine (BETADINE) 10 % topical solution, , , PRN, Luca Aleman MD, 17.5 mL at 02/05/20 1211    Facility-Administered Medications Ordered in Other Encounters:    PHENYLephrine (DIOGO-SYNEPHRINE) 100 mcg/mL in NS syringe, , IntraVENous, PRN, Hector Johnson, CRNA, 100 mcg at 02/05/20 1308    rocuronium injection, , IntraVENous, PRN, Hector Johnson, CRNA, 15 mg at 02/05/20 1216    propofol (DIPRIVAN) 10 mg/mL injection, , IntraVENous, PRN, Hector Johnson, CRNA, 150 mg at 02/05/20 1126    lidocaine (PF) (XYLOCAINE) 20 mg/mL (2 %) injection, , IntraVENous, PRN, Hector Johnson, CRNA, 80 mg at 02/05/20 1126    succinylcholine (ANECTINE) injection, , IntraVENous, PRN, [place], Vanessa Hannah, CRNA, 160 mg at 02/05/20 1126    ondansetron (ZOFRAN) injection, , IntraVENous, PRN, Hector Johnson, CRNA, 4 mg at 02/05/20 1241    neostigmine (PROSTIGMINE) 1 mg/mL syringe, , IntraVENous, PRN, Hector Johnson CRNA, 3 mg at 02/05/20 1308    glycopyrrolate (ROBINUL) injection, , IntraVENous, PRN, Hector Johnson, CRNA, 0.4 mg at 02/05/20 1308    fentaNYL citrate (PF) injection, , IntraVENous, PRN, Vanessa Johnson, CRNA, 50 mcg at 02/05/20 1309    Aspirin and Other medication    Physical Exam:  General A&O x3 NAD, well developed, well nourished, normal affect  Heart: S1-S2, RRR  Lungs: CTA Bilat  Abd: soft NT, ND  Ext: n/v intact    Hospital Course:    Pt. Had rightOrthopedic / Rheumatologic: Total Hip Replacement    Post -op Course: The patient tolerated the procedure well. They were followed by internal medicine for help with medical management. Pt. Was place on Abx pre and post-op for prophylaxis against infection as well as coumadin pre and post-op for prophylaxis against DVT. Vitals signs remained stable, remained af. The wound wasclean, dry, no drainage. Pain was well controlled. Pt. Had negative calf tenderness or swelling, no evidence for DVT. Patient had PT/OT consult for evaluation and treatment.     CBC  Lab Results   Component Value Date/Time    WBC 6.7 01/20/2020 07:55 AM    RBC 4.46 01/20/2020 07:55 AM    HCT 38.7 01/20/2020 07:55 AM    MCV 86.8 01/20/2020 07:55 AM    MCH 29.6 01/20/2020 07:55 AM    MCHC 34.1 01/20/2020 07:55 AM    RDW 13.5 01/20/2020 07:55 AM     Coagulation  Lab Results   Component Value Date    INR 1.0 02/05/2020    APTT 33.9 01/20/2020      Basic Metabolic Profile  Lab Results   Component Value Date     01/20/2020    CO2 31 01/20/2020    BUN 16 01/20/2020       Discharge Meds:  Current Discharge Medication List      START taking these medications    Details   oxyCODONE-acetaminophen (PERCOCET) 7.5-325 mg per tablet Take 1-2 Tabs by mouth every four (4) hours as needed for Pain for up to 7 days. Max Daily Amount: 12 Tabs. Qty: 56 Tab, Refills: 0    Associated Diagnoses: Hip arthritis      docusate sodium (COLACE) 100 mg capsule Take 1 Cap by mouth two (2) times a day for 90 days. Qty: 60 Cap, Refills: 2    Associated Diagnoses: Hip arthritis      warfarin (COUMADIN) 3 mg tablet Take 1 Tab by mouth daily for 21 days. Qty: 30 Tab, Refills: 1    Associated Diagnoses: Hip arthritis      cephALEXin (KEFLEX) 500 mg capsule Take 1 Cap by mouth four (4) times daily. Qty: 8 Cap, Refills: 0    Associated Diagnoses: Hip arthritis         CONTINUE these medications which have NOT CHANGED    Details   levothyroxine (SYNTHROID) 150 mcg tablet       losartan-hydroCHLOROthiazide (HYZAAR) 100-12.5 mg per tablet       amLODIPine (NORVASC) 5 mg tablet Take 5 mg by mouth daily. Biotin 2,500 mcg cap Take  by mouth. STOP taking these medications       omega 3-DHA-EPA-fish oil 1,000 mg (120 mg-180 mg) capsule Comments:   Reason for Stopping:         diclofenac EC (VOLTAREN) 75 mg EC tablet Comments:   Reason for Stopping:               Discharge Plan:  The patient will be d/c'd to home, total hip protocol, WBAT. She will have [facility] PT and nursing. Total joint protocol. Pt safe for homebound transfer, sp Total joint replacement. A walker, bedside commode, and shower chair will be utilized for ADL's. Follow up with Dr. India Robledo in 10-12 days.   Call with any questions or concerns.

## 2020-02-05 NOTE — ANESTHESIA PROCEDURE NOTES
Peripheral Block    Start time: 2/5/2020 11:00 AM  End time: 2/5/2020 11:07 AM  Performed by: Maty Rey MD  Authorized by: Maty Rey MD       Pre-procedure: Indications: at surgeon's request, post-op pain management and procedure for pain    Preanesthetic Checklist: patient identified, risks and benefits discussed, site marked, timeout performed, anesthesia consent given and patient being monitored    Timeout Time: 11:00          Block Type:   Block Type:  Lumbar plexus  Laterality:  Right  Monitoring:  Standard ASA monitoring, heart rate, continuous pulse ox, frequent vital sign checks, oxygen and responsive to questions  Injection Technique:  Single shot  Procedures: nerve stimulator    Patient Position: left lateral decubitus  Prep: chlorhexidine    Location:  Sacral area  Needle Type:  Stimuplex  Needle Gauge:  21 G  Needle Localization:  Anatomical landmarks and nerve stimulator    Assessment:  Number of attempts:  1  Injection Assessment:  Incremental injection every 5 mL, negative aspiration for blood, no intravascular symptoms, no paresthesia and negative aspiration for CSF  Patient tolerance:  Patient tolerated the procedure well with no immediate complications  Location:  PREOP HOLDING    Patient given 2 mg IV Versed and 100 mcg IV Fentanyl for sedation.     2/5/2020     11:08 AM     Kishore Posada MD

## 2020-02-05 NOTE — PROGRESS NOTES
Problem: Infection - Risk of, Surgical Site Infection  Goal: *Absence of surgical site infection signs and symptoms  Outcome: Progressing Towards Goal     Problem: Patient Education: Go to Patient Education Activity  Goal: Patient/Family Education  Outcome: Progressing Towards Goal     Problem: Deep Venous Thrombosis - Risk of  Goal: *Absence of deep venous thrombosis signs and symptoms(Stroke Metric)  Outcome: Progressing Towards Goal  Goal: *Absence of impaired coagulation signs and symptoms  Outcome: Progressing Towards Goal  Goal: *Knowledge of prescribed medications  Outcome: Progressing Towards Goal  Goal: *Absence of bleeding  Outcome: Progressing Towards Goal     Problem: Patient Education: Go to Patient Education Activity  Goal: Patient/Family Education  Outcome: Progressing Towards Goal

## 2020-02-05 NOTE — ADVANCED PRACTICE NURSE
Mobility Intervention:       [x] Pt dangled at edge of bed    [] Pt assisted OOB to bedside commode    [] Pt assisted OOB to chair    [] Pt ambulated to bathroom    [] Patient was ambulated in room/hallway    Assistive Device Utilized:       [] Rolling walker   [] Crutches   [] Straight Cane   [] Knee immobilizer   [] IV pole    After Rounding and Checking on Patient     [] Patient left in no apparent distress sitting up in chair  [x] Patient left in no apparent distress in bed  [x] Call bell left within reach  [x] SCDs on both legs & machine turned on  [x] Ice applied  [] RN notified  []  present  [] Bed alarm activated    Reason patient not mobilized:      [] Patient refused   [] Nausea/vomiting   [] Low blood pressure   [x] Drowsy/lethargic  Pt unable to bear weight in leg due to block    Pain Rating:     [] 1  [] 6  [] 2  [] 7  [] 3  [] 8  [x] 4  [] 9  [] 5  [] 10    Left patient with call light, cell phone and personal belongings in reach for safety.

## 2020-02-05 NOTE — ANESTHESIA PREPROCEDURE EVALUATION
Relevant Problems   No relevant active problems       Anesthetic History   No history of anesthetic complications            Review of Systems / Medical History  Patient summary reviewed and pertinent labs reviewed    Pulmonary  Within defined limits                 Neuro/Psych   Within defined limits           Cardiovascular    Hypertension: well controlled              Exercise tolerance: >4 METS     GI/Hepatic/Renal                Endo/Other      Hypothyroidism: well controlled  Arthritis     Other Findings              Physical Exam    Airway  Mallampati: II  TM Distance: 4 - 6 cm  Neck ROM: normal range of motion   Mouth opening: Normal     Cardiovascular  Regular rate and rhythm,  S1 and S2 normal,  no murmur, click, rub, or gallop             Dental  No notable dental hx       Pulmonary  Breath sounds clear to auscultation               Abdominal  GI exam deferred       Other Findings            Anesthetic Plan    ASA: 2  Anesthesia type: general and regional - lumbar plexus block          Induction: Intravenous  Anesthetic plan and risks discussed with: Patient

## 2020-02-05 NOTE — PROGRESS NOTES
Pharmacy Services    Losartan + hydrochlorothizide therapeutically interchanged for Hyzaar (losartan/hydrochlorothiazide) per the P&[employer] approved therapeutic interchange policy.      Thank you,  Fany Watkins, PharmD

## 2020-02-05 NOTE — PROGRESS NOTES
Problem: Mobility Impaired (Adult and Pediatric)  Goal: *Acute Goals and Plan of Care (Insert Text)  Description  Physical Therapy Goals  Initiated 2/5/2020 and to be accomplished within 7 day(s)  1. Patient will move from supine to sit and sit to supine  in bed with supervision/set-up. 2.  Patient will transfer from bed to chair and chair to bed with supervision/set-up using the least restrictive device. 3.  Patient will perform sit to stand with supervision/set-up. 4.  Patient will ambulate with supervision/set-up for 100 feet with the least restrictive device. PLOF: Patient reports she was independent with self-care and ambulates household distances with RW. She uses WC/ scooter in community. Pt lives in two story home with friend, with first floor set up. Outcome: Progressing Towards Goal       PHYSICAL THERAPY EVALUATION    Patient: Heena Quinones (83 y.o. female)  Date: 2/5/2020  Primary Diagnosis: Osteoarthritis of right hip, unspecified osteoarthritis type [M16.11]  Hip arthritis [M16.10]  Hip dysplasia [Q65.89]  Procedure(s) (LRB):  RIGHT TOTAL HIP ARTHROPLASTY LATERAL APPROACH (Right) Day of Surgery   Precautions:   Fall, Total hip      ASSESSMENT :  RN cleared pt for PT based on clinical judgement, as pt was unable to stand with assistance from nursing staff earlier. Pt agreeable to PT evaluation/treatment. Based on the objective data described below, the patient presents with decreased strength, impaired right LE sensation, impaired balance, and increased fall risk with mobility. Educated pt on hip precautions, role of PT, and goals. Assisted pt to use bed pan per pt request, performs rolling to the left with CGA increased time to complete. Therapist assisted with pericare while pt maintained sidelying position holding onto bed rails. Pt performs supine to sit with min A needing cues for sequencing. Pt has impaired right LE sensation and decreased strength.  Pt deemed unsafe to further engage in functional mobility OOB due high falls risk. Pt c/o nausea/dizziness sitting EOB and requests to return to bed. Patient will benefit from skilled intervention to address the above impairments. Patient's rehabilitation potential is considered to be Good  Factors which may influence rehabilitation potential include:   []         None noted  []         Mental ability/status  [x]         Medical condition  [x]         Home/family situation and support systems  [x]         Safety awareness  [x]         Pain tolerance/management  []         Other:      PLAN :  Recommendations and Planned Interventions:   [x]           Bed Mobility Training             [x]    Neuromuscular Re-Education  [x]           Transfer Training                   []    Orthotic/Prosthetic Training  [x]           Gait Training                          []    Modalities  [x]           Therapeutic Exercises           []    Edema Management/Control  [x]           Therapeutic Activities            [x]    Family Training/Education  [x]           Patient Education  []           Other (comment):    Frequency/Duration: Patient will be followed by physical therapy 1-2 times per day/4-7 days per week to address goals. Discharge Recommendations: Home Health with 24h assistance vs rehab pending pts progress  Further Equipment Recommendations for Discharge: bedside commode, rolling walker and N/A     SUBJECTIVE:   Patient stated Lindsay Mai tried to get my up to use the bathroom a little while ago but I didn't go well, I couldn't stand.     OBJECTIVE DATA SUMMARY:     Past Medical History:   Diagnosis Date    Arthritis     Fibromyalgia     Hypertension     Thyroid disease      Past Surgical History:   Procedure Laterality Date    HX HYSTERECTOMY      partial    HX THYROIDECTOMY       Barriers to Learning/Limitations: None  Compensate with: N/A  Home Situation:  Home Situation  Home Environment: Private residence  # Steps to Enter: 1  One/Two Story Residence: Two story  # of Interior Steps: 15  Interior Rails: Left  Living Alone: Yes  Support Systems: Friends \ neighbors  Patient Expects to be Discharged to[de-identified] Private residence  Current DME Used/Available at Home: Walker, rolling       Strength:    Strength: Generally decreased, functional(right LE)       Tone & Sensation:   Tone: Normal  Sensation: Impaired(right LE)       Range Of Motion:  AROM: Within functional limits       Functional Mobility:  Bed Mobility:  Rolling: Contact guard assistance; Additional time  Supine to Sit: Minimum assistance; Additional time  Sit to Supine: Minimum assistance  Scooting: Contact guard assistance  Transfers:  Sit to Stand: (unsafe to for OOB mobility due to right LE numbness/weakness)     Balance:   Sitting: Intact; With support      Therapeutic Exercises:   Educated on ankle pumps to improve circulation. Pain:  Pain level pre-treatment: 3/10   Pain level post-treatment: 3/10   Pain Intervention(s) : Medication (see MAR); Rest, Ice, Repositioning  Response to intervention: Nurse notified, See doc flow    Activity Tolerance:   Poor  Please refer to the flowsheet for vital signs taken during this treatment. After treatment:   []         Patient left in no apparent distress sitting up in chair  [x]         Patient left in no apparent distress in bed  [x]         Call bell left within reach  [x]         Nursing notified  []         Caregiver present  []         Bed alarm activated  []         SCDs applied    COMMUNICATION/EDUCATION:   [x]         Role of Physical Therapy in the acute care setting. [x]         Fall prevention education was provided and the patient/caregiver indicated understanding. [x]         Patient/family have participated as able in goal setting and plan of care. [x]         Patient/family agree to work toward stated goals and plan of care. []         Patient understands intent and goals of therapy, but is neutral about his/her participation.   [] Patient is unable to participate in goal setting/plan of care: ongoing with therapy staff.  []         Other:     Thank you for this referral.  Ally Cuevas, PT   Time Calculation: 23 mins      Eval Complexity: History: MEDIUM  Complexity : 1-2 comorbidities / personal factors will impact the outcome/ POC Exam:MEDIUM Complexity : 3 Standardized tests and measures addressing body structure, function, activity limitation and / or participation in recreation  Presentation: MEDIUM Complexity : Evolving with changing characteristics  Clinical Decision Making:Medium Complexity    Overall Complexity:MEDIUM

## 2020-02-05 NOTE — BRIEF OP NOTE
BRIEF OPERATIVE NOTE    Date of Procedure: 2/5/2020   Preoperative Diagnosis: Osteoarthritis of right hip, unspecified osteoarthritis type [M16.11] with dysplasia, ankylosis, abductor atrophy  Postoperative Diagnosis: Osteoarthritis of right hip, unspecified osteoarthritis type [M16.11]  same  Procedure(s):  RIGHT TOTAL HIP ARTHROPLASTY LATERAL APPROACH difficult  Surgeon(s) and Role:     * Sondra Schneider MD - Primary         Surgical Assistant: Sherrill Farfan    Surgical Staff:  Circ-1: Neil Magana  Physician Assistant: Shayne Carrera PA-C  Scrub Tech-1: Min Alvarado  Surg Asst-1: Lillian Ryan  Event Time In Time Out   Incision Start 1151    Incision Close       Anesthesia: General   Estimated Blood Loss: 300ml  Specimens:   ID Type Source Tests Collected by Time Destination   1 : Right Femoral Head Preservative Hip, right  Sondra Schneider MD 2/5/2020 1204 Pathology      Findings: same   Complications: none  Implants:   Implant Name Type Inv. Item Serial No.  Lot No. LRB No. Used Action   PINNACLE GRIPTION ACETABULAR SHELL SECTOR    [facility] [id number] Right 1 Implanted   SCREW BONE FEM CANC 6. [id number] - [id number]  SCREW BONE FEM CANC 6. [id number]  [facility] [employer] [id number] Right 1 Implanted   SCREW BONE FEM CANC 6. [id number] - [id number]  SCREW BONE FEM CANC 6. [id number]  [facility] [employer] [id number] Right 1 Implanted   ELIMINATOR APEX HOLE POSITIVE - [id number]  ELIMINATOR APEX HOLE POSITIVE  [facility] [employer] [id number] Right 1 Implanted   SCREW BONE FEM CANC 6. [id number] - [id number]  SCREW BONE FEM CANC 6. [facility] [id number] Right 1 Implanted   LINER ACET PINN NEUT [id number] -- ALTRX - [id number]  LINER ACET PINN NEUT [id number] -- ALTRX  [facility] [employer] [id number] Right 1 Implanted   ALTRX +4 NEUT [id number] - [id number]  ALTRX +4 NEUT [id number]  [facility] [employer] [id number] Right 1 Implanted   SLEEVE FEM PROX ZTT 16 B SM -- S-ROM - [id number]  SLEEVE FEM PROX ZTT 16 B SM -- S-ROM  [facility] [employer]THOPED[employer] [id number] Right 1 Implanted   STEM FEM STD 36NK [id number] -- S-ROM - [id number]  STEM FEM STD 36NK [id number] -- S-ROM  [facility] [employer] [id number] Right 1 Implanted   HEAD FEM S-ROM 36MM +6MM NK -- BIOLOX DELTA - [id number]  HEAD FEM S-ROM 36MM +6MM NK -- BIOLOX DELTA  [facility] [employer]THOP[employer] [id number] Right 1 Implanted

## 2020-02-05 NOTE — PERIOP NOTES
TRANSFER - OUT REPORT:    Verbal report given to MEENU Anderson on Adan Barron  being transferred to (unit) for routine post - op       Report consisted of patients Situation, Background, Assessment and   Recommendations(SBAR). Information from the following report(s) SBAR, Kardex, Procedure Summary, Intake/Output, MAR and Recent Results was reviewed with the receiving nurse. Lines:   Peripheral IV 02/05/20 Left Wrist (Active)   Site Assessment Clean, dry, & intact 2/5/2020  1:43 PM   Phlebitis Assessment 0 2/5/2020  1:43 PM   Infiltration Assessment 0 2/5/2020  1:43 PM   Dressing Status Clean, dry, & intact 2/5/2020  1:43 PM   Dressing Type Transparent;Tape 2/5/2020  1:43 PM   Hub Color/Line Status Pink; Infusing 2/5/2020  1:43 PM      Visit Vitals  /69   Pulse 62   Temp 97.5 °F (36.4 °C)   Resp 16   Ht 5' 8\" (1.727 m)   Wt 96.6 kg (213 lb)   SpO2 93%   BMI 32.39 kg/m²     Opportunity for questions and clarification was provided.       Patient transported with:   O2 @ 3 liters  Registered Nurse  [facility]

## 2020-02-06 ENCOUNTER — HOME HEALTH ADMISSION (OUTPATIENT)
Dept: HOME HEALTH SERVICES | Facility: HOME HEALTH | Age: 65
End: 2020-02-06
Payer: MEDICAID

## 2020-02-06 VITALS
OXYGEN SATURATION: 95 % | HEART RATE: 73 BPM | DIASTOLIC BLOOD PRESSURE: 60 MMHG | BODY MASS INDEX: 32.28 KG/M2 | HEIGHT: 68 IN | WEIGHT: 213 LBS | SYSTOLIC BLOOD PRESSURE: 95 MMHG | RESPIRATION RATE: 16 BRPM | TEMPERATURE: 98 F

## 2020-02-06 LAB
INR PPP: 1.1 (ref 0.8–1.2)
PROTHROMBIN TIME: 14.1 SEC (ref 11.5–15.2)

## 2020-02-06 PROCEDURE — 97530 THERAPEUTIC ACTIVITIES: CPT

## 2020-02-06 PROCEDURE — 51798 US URINE CAPACITY MEASURE: CPT

## 2020-02-06 PROCEDURE — 36415 COLL VENOUS BLD VENIPUNCTURE: CPT

## 2020-02-06 PROCEDURE — 85610 PROTHROMBIN TIME: CPT

## 2020-02-06 PROCEDURE — 74011250637 HC RX REV CODE- 250/637: Performed by: PHYSICIAN ASSISTANT

## 2020-02-06 PROCEDURE — 74011250637 HC RX REV CODE- 250/637: Performed by: ORTHOPAEDIC SURGERY

## 2020-02-06 PROCEDURE — 97535 SELF CARE MNGMENT TRAINING: CPT

## 2020-02-06 PROCEDURE — 97116 GAIT TRAINING THERAPY: CPT

## 2020-02-06 PROCEDURE — 97165 OT EVAL LOW COMPLEX 30 MIN: CPT

## 2020-02-06 PROCEDURE — 74011250636 HC RX REV CODE- 250/636: Performed by: PHYSICIAN ASSISTANT

## 2020-02-06 RX ORDER — SODIUM CHLORIDE 9 MG/ML
500 INJECTION, SOLUTION INTRAVENOUS ONCE
Status: COMPLETED | OUTPATIENT
Start: 2020-02-06 | End: 2020-02-06

## 2020-02-06 RX ORDER — WARFARIN 4 MG/1
8 TABLET ORAL ONCE
Status: COMPLETED | OUTPATIENT
Start: 2020-02-06 | End: 2020-02-06

## 2020-02-06 RX ADMIN — WARFARIN SODIUM 8 MG: 4 TABLET ORAL at 09:30

## 2020-02-06 RX ADMIN — SENNOSIDES AND DOCUSATE SODIUM 1 TABLET: 8.6; 5 TABLET ORAL at 09:31

## 2020-02-06 RX ADMIN — LEVOTHYROXINE SODIUM 150 MCG: 150 TABLET ORAL at 09:31

## 2020-02-06 RX ADMIN — PREGABALIN 25 MG: 25 CAPSULE ORAL at 09:30

## 2020-02-06 RX ADMIN — FERROUS SULFATE TAB 325 MG (65 MG ELEMENTAL FE) 325 MG: 325 (65 FE) TAB at 09:30

## 2020-02-06 RX ADMIN — ACETAMINOPHEN 1000 MG: 500 TABLET ORAL at 02:40

## 2020-02-06 RX ADMIN — SODIUM CHLORIDE 500 ML: 900 INJECTION, SOLUTION INTRAVENOUS at 09:32

## 2020-02-06 RX ADMIN — CELECOXIB 200 MG: 100 CAPSULE ORAL at 09:31

## 2020-02-06 NOTE — PROGRESS NOTES
Mobility Intervention:       [] Pt dangled at edge of bed    [] Pt assisted OOB to bedside commode    [] Pt assisted OOB to chair    [] Pt ambulated to bathroom    [] Patient was ambulated in room/hallway    Assistive Device Utilized:       [] Rolling walker   [] Crutches   [] Straight Cane   [] Knee immobilizer   [] IV pole    After Mobilization:     [] Patient left in no apparent distress sitting up in chair  [] Patient left in no apparent distress in bed  [] Call bell left within reach  [] SCDs on & machine turned on  [] Ice applied  [] RN notified  [] Caregiver present  [] Bed alarm activated    Reason patient not mobilized:      [x] RLE still weak but better than last night   [] Nausea/vomiting   [] Low blood pressure   [] Drowsy/lethargic    Pain Rating:     [] 0  [] 1  Assistive Device:        [] 2  [] 3  [] 4  [] 5  [] 6  Assistive Device:        [] 7  [] 8  [] 9  [] 10    Comments:

## 2020-02-06 NOTE — PROGRESS NOTES
Medicaid transportation set up for d/c time of 12pm. Confirmation # [id number]        Discharge order noted for today. Pt has been accepted to  [facility] agency. Met with patient and agreeable to the transition plan today. Transport will need to be arranged through [place] transportation. Patient's discharge summary and home health  orders have been forwarded to [address] [place] [address] via [place]. Updated bedside RN,  to the transition plan. Discharge information has been documented on the AVS.No further equipment needs. Pt has walker, cane, bedside commode and shower chair already in the home.        Beata Gaona RN BSN

## 2020-02-06 NOTE — CONSULTS
[facility]ist [facility]  Progress Note    Patient: Davidson Erickson Age: 60 y.o. : 1955 MR#: [id number] SSN: [id number]  Date: 2020     Subjective:     Right hip pain but overall controlled; cont to have weakness of Right leg; Denies SOB, CP, nausea / vomiting or constipation at present    Pt reports long term intermittent centralized \"aching\" chest pain occurring sporadically and resolves w/o intervention, not exacerbated with activity. Pt states pain has been present since her knowledge of hiatal hernia approx 20 years ago. Assessment/Plan:   1. Right hip osteoarthritis s/p total hip replacement - pain control / DVT prophylaxis per ortho  2. Hypertension - controlled, cont norvasc / HCTZ / cozaar as scheduled  3. Hypothyroidism - cont synthroid  4. Large hiatal hernia - per xray on 20; pt reports long term x 20 year duration  5. Non exertional CP - pt reports chronic / not r/t exertion etc - ?  R/t hiatal hernia; encourage OP follow up and discussion w/ PCP    Additional Notes:      Case discussed with:  [x]Patient  []Family  [x]Nursing  []Case Management  DVT Prophylaxis:  []Lovenox  [x]asa per ortho  []SCDs  []Coumadin   []On Heparin gtt    Objective:   VS:   Visit Vitals  /68 (BP 1 Location: Right arm, BP Patient Position: At rest)   Pulse 85   Temp 96.6 °F (35.9 °C)   Resp 16   Ht 5' 8\" (1.727 m)   Wt 96.6 kg (213 lb)   SpO2 91%   BMI 32.39 kg/m²      Tmax/24hrs: Temp (24hrs), Av.3 °F (36.3 °C), Min:96.5 °F (35.8 °C), Max:98.1 °F (36.7 °C)      Intake/Output Summary (Last 24 hours) at 2020  Last data filed at 2020 1831  Gross per 24 hour   Intake 1800 ml   Output 200 ml   Net 1600 ml     General:  Alert, NAD  Cardiovascular:  RRR  Pulmonary:  LSC throughout  GI:  +BS in all four quadrants, soft, non-tender  Extremities:  No edema; 2+ dorsalis pedis pulses bilaterally; right hip dressing C/I scant drainage  Neuro: alert and oriented x 4    Labs:    Recent Results (from the past 24 hour(s))   TYPE & SCREEN    Collection Time: 02/05/20  9:03 AM   Result Value Ref Range    Crossmatch Expiration 02/08/2020     ABO/Rh(D) A POSITIVE     Antibody screen NEG    PROTHROMBIN TIME + INR    Collection Time: 02/05/20  9:03 AM   Result Value Ref Range    Prothrombin time 12.5 11.5 - 15.2 sec    INR 1.0 0.8 - 1.2       Signed By: Nelly De La Cruz NP     February 5, 2020

## 2020-02-06 NOTE — OP NOTES
[facility]  OPERATIVE REPORT    Name:  Andriy Mayer  MR#:   [id number]  :  1955  ACCOUNT #:  [de-identified]  DATE OF SERVICE:  2020      PREOPERATIVE DIAGNOSES:  End-stage arthritis of the right hip with significant acetabular and femoral dysplasia, osteophytosis, ankylosis, and also abductor atrophy, capsular contracture. POSTOPERATIVE DIAGNOSES:  End-stage arthritis of the right hip with significant acetabular and femoral dysplasia, osteophytosis, ankylosis, and also abductor atrophy, capsular contracture. PROCEDURES PERFORMED:  1. Difficult right hip replacement using the DePuy S-ROM system using a 52 mm Gription Niagara cup with 3-screw augmentation, neutral lateralized +4 liner 36, a 16 x 11 x 150 36 standard neck S-ROM femoral component, a 16 B small triangle, and a +6 ceramic femoral head. 2.  Removal of ankylosing osteophytosis. 3.  Subtotal capsulectomy. 4.  Rebalancing of hip.  5.  20 degrees of retroversion applied to the femoral component. 6.  DBX bone grafting to superior posterior acetabular deficit. SURGEON:  Susan Paige MD.    FIRST ASSISTANT:  Ya Montalvo. SECOND ASSISTANT:  Venora Lesch. ANESTHETIST:  Dr. Reuben Segovia. ANESTHESIA:  Preoperative nerve block with light general.    COMPLICATIONS:  None. SPECIMENS REMOVED:  Femoral head. IMPLANTS:  As above mentioned. ESTIMATED BLOOD LOSS:  300.    Ya Montalvo was the first assistant who assisted with all phases of the surgery commencing with patient positioning, patient prep, leg positioning during surgery, retracting, assisting with the surgery itself, closure, dressing placement, and transfer. 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 We had revision available as well and all risks and benefits described to the patient and family and elected to proceed.   Leg lengths were determined, found to be short on the infected extremity in lateral decubitus position, taking great care to pad all sensitive areas, ensured the pelvis was square. A standard prep and drape, antibiotics confirmed given, and a time-out performed. A lengthy lateral approach was performed. She had at least 4-5 inches of adipose tissue prior to getting to the fascia. The IT band was delineated and incised sharply. Sciatic nerve identified, protected, and avoided and the Charnley retractor introduced. [address] released with capsular scissors and excised. The gluteus medius identified and was quite attenuated due to disuse and fatty infiltrate, although intact. Minimus and capsule divided directly over femoral neck carrying back to the tip of the trochanter anteriorly along down the vastus lateralis. Whole cup of tissue was taken in one contagious layer and lesser trochanter identified. Pin was placed in the superior iliac crest for offset and leg length measurement. We had to do some further capsular release and a small osteophyte removal prior to dislocation. I dislocated the hip. The femur was anteverted at least 20-25 degrees, more than normal, and we used the osteotomy guide to plan our osteotomy and protected the trochanter and soft tissue structures. We then instrumented around the acetabular area, so a transverse acetabular ligament was hypertrophied, calcified, and was taken down with combination of electrocautery and [place]. We instrumented around the acetabular shell, identified the medial wall for the foveal notch. She was quite dysplastic superolaterally as well and also the anterior wall was quite dysplastic. With this in mind, we were very careful with the preoperative templating and reamed appropriately. We medialized just mildly as per the preoperative templating and appropriate inclination and anteversion reamed up to accommodate the cup.   Trialed a 46, I was very pleased with it and was left with a small defect posterior superior, which we would use DBX to fill this in later. Initially, we trialed the cup. I was very pleased with it and implanted a definitive cup and augmented with some screws in the safe zone, removed extensive posterior inferior osteophytosis. Excellent hemostasis with the Aquamantys and used the Aquamantys and Exparel cocktail around the cup initially with the trial liner. We will go back later for the lateralized +4 liner. Made sure it was fully seated, protected with the Ray-Jag gauze, which later will be accounted for. We were very careful with the inclination and anteversion as well. We then approached on the femoral side and used the circular osteotome and used the lateralizing reamer and then reamed up distally, then reamed proximally, and then prepared for the cone where there was a B small. We trialed with a 20 degrees of retroversion and reduced the hip and revisited our offset and leg lengths. I thought that the +5 offset on the femoral stem would be too much and I thought the lateralized liner would be just perfect. We trialed that, I was very pleased with it and inserted the definitive liner, made sure it was fully seated and protected with Ray-Jag gauze, which will later be accounted for. We then implanted definitive components and then retrialed and I was happiest with the +6, which gave anatomic restoration of offset and leg lengths. Combined anteversion was excellent and no impingement. We did bone graft DBX of the superior acetabular area. I was very happy with the overall leg lengths, offset, and stability and no impingement. I was delighted with the result. We then lavaged out and placed our final head and made sure it was fully seated and then reduced the hip again. Looking at the functioning of the hip, I was delighted. Excellent soft tissue balance and no impingement, excellent combined anteversion.     Dilute Betadine wash, routine closure and clips for the skin. At the end of the case, instrument, sponge, and needle count was correct. There were no complications. The patient tolerated the procedure well. Overall, excellent outcome to a very difficult case, very dysplastic, very stiff, osteophytic,  also dysplastic acetabulum both anterior and superiorly requiring advanced reaming techniques and bone grafting as well as abnormal anatomy and also requirement for retroversion on the stem. Overall, excellent outcome to a difficult case. I was delighted with the result. No complication.       Chalino Mcnamara MD [id number]  D:  02/05/2020 13:23  T:  02/06/2020 0:23  JOB #:  [id number]

## 2020-02-06 NOTE — PROGRESS NOTES
End of Shift Note     Bedside and verbal shift change report given to Celine Mcdaniel RN (On coming nurse) by Flaco Syed RN (Off going nurse).   Report included the following information:      --Procedure Summary     --MAR,     --Recent Results     --Med Rec Status    SBAR Recommendations:     Issues for Provider to address           Activity This Shift     [] Bed Rest Order   [] Refused   [x] Dangled    [] TDWB         Ambulating:     [] Bathroom     [] BSC     [] Room/Hallway      Up in Chair for meals    []Yes [] No   Voiding       [x] Yes  [] No  Dixon          [] Yes  [] No  Incontinent [] Yes  [] No     DUE TO VOID POUR        [] Yes [] No  Purewick    [] Yes [] No  New Onset [] Yes [] No Straight Cath   []Yes  [] No  Condom Cath  [] Yes [] No  MD Called      [] Yes  [] No   Blood Sugars Managed []Yes [x] No    Bowels Moved [] Yes [x] No    Incontinent     [] Yes [x] No Passed Gas []Yes [] No    New Onset  []Yes [] No        MD Called []Yes  [] No     CHG Bath Done     Before Surgery     After Surgery      [x] Yes  [] No  [] Yes  [x] No       Drain Removed [] Yes  [] No [x] N/A    Dressing Changed [] Yes   [x] No [] N/A      Nausea/Vomiting [] Yes   [x] No     Ice Packs Changed [x] Yes   [] No  [] N/A    Incentive Spirometer  [x] Yes  [] No      SCD Pumps On     Ankle Pumping  [x] Yes   [] No      [] Yes   [x] No        Telemetry Monitoring [] Yes   [x] No   Rhythm

## 2020-02-06 NOTE — PROGRESS NOTES
Problem: Self Care Deficits Care Plan (Adult)  Goal: *Acute Goals and Plan of Care (Insert Text)  Outcome: Resolved/Met     OCCUPATIONAL THERAPY EVALUATION/DISCHARGE    Patient: Valencia Luis (80 y.o. female)  Date: 2/6/2020  Primary Diagnosis: Osteoarthritis of right hip, unspecified osteoarthritis type [M16.11]  Hip arthritis [M16.10]  Hip dysplasia [Q65.89]  Procedure(s) (LRB):  RIGHT TOTAL HIP ARTHROPLASTY LATERAL APPROACH (Right) 1 Day Post-Op   Precautions: Fall, Total hip, WBAT  PLOF: Patient was modified independent with self-care and functional mobility PTA. ASSESSMENT AND RECOMMENDATIONS:  Upon entering the room, the pt was semi-reclined in bed, alert, and agreeable to participate in OT evaluation. Pt educated on the role of OT, WB status, hip precautions, adaptive equipment (reacher, sock aid, long handled sponge, long handled shoe horn, raised toilet seat) with pt demonstrating good understanding. Pt issued adaptive equipment to utilize while following hip precautions, and used during evaluation to get dressed. Pt educated on sleeping with pillow between knees to maintain post-op precautions and provided with a handout of precautions along with techniques/strategies to follow in order to perform ADL/IADL tasks safely and efficiently. Based on the objective data described below, the patient is able to perform basic self care tasks with independently, using AE given after demonstration while seated. The pt presents with good static standing and fair dynamic standing balance using personal RW in room, however will defer to PT for functional balance and functional mobility tasks. Pt is motivated to return home and has a supportive family at home to assist prn. No further skilled OT needed at this level of care. OT to d/c from caseload. Skilled occupational therapy is not indicated at this time.   Discharge Recommendations: Home Health with Supervision  Further Equipment Recommendations for Discharge: bedside commode and shower chair to decrease the risk of falls     SUBJECTIVE:   Patient stated I use my walker to pull up from the commode    OBJECTIVE DATA SUMMARY:     Past Medical History:   Diagnosis Date    Arthritis     Fibromyalgia     Hypertension     Thyroid disease      Past Surgical History:   Procedure Laterality Date    HX HYSTERECTOMY      partial    HX THYROIDECTOMY       Barriers to Learning/Limitations: None  Compensate with: visual, verbal, tactile, kinesthetic cues/model    Home Situation:   Home Situation  Home Environment: Private residence  # Steps to Enter: 1  One/Two Story Residence: Two story  # of Interior Steps: 15  Interior Rails: Left  Living Alone: Yes  Support Systems: Friends \ neighbors  Patient Expects to be Discharged to[de-identified] Private residence  Current DME Used/Available at Home: Dorn Olszewski, rolling  Tub or Shower Type: Tub/Shower combination  [x]     Right hand dominant   []     Left hand dominant    Cognitive/Behavioral Status:  Neurologic State: Alert  Orientation Level: Oriented X4  Cognition: Follows commands  Safety/Judgement: Fall prevention    Skin: Intact  Edema: None noted    Vision/Perceptual:    Acuity: Within Defined Limits;Able to read normal print without difficulty      Coordination: BUE  Fine Motor Skills-Upper: Left Intact; Right Intact    Gross Motor Skills-Upper: Left Intact; Right Intact    Balance:  Sitting: Impaired; Without support  Sitting - Static: Good (unsupported)  Sitting - Dynamic: Fair (occasional)  Standing: Impaired; With support  Standing - Static: Good  Standing - Dynamic : Fair    Strength: BUE  Strength: Generally decreased, functional    Tone & Sensation: BUE  Tone: Normal  Sensation: Intact    Range of Motion: BUE  AROM: Within functional limits    Functional Mobility and Transfers for ADLs:    Transfers:  Sit to Stand: Stand-by assistance  Stand to Sit: Stand-by assistance   Toilet Transfer : Stand-by assistance(simulation with recliner)    ADL Assessment:  Feeding: Independent    Oral Facial Hygiene/Grooming: Independent    Bathing: Modified independent; Adaptive equipment    Upper Body Dressing: Independent    Lower Body Dressing: Modified independent; Adaptive equipment    Toileting: Independent    ADL Intervention:  Grooming  Grooming Assistance: Independent  Washing Hands: Independent    Upper Body Dressing Assistance  Dressing Assistance: Independent  Pullover Shirt: Independent(2 shirts`)    Lower Body Dressing Assistance  Dressing Assistance: Modified independent  Underpants: Modified independent  Pants With Elastic Waist: Modified independent  Socks: Modified independent  Slip on Shoes with Back: Modified independent(doffing)  Position Performed: Seated in chair;Standing  Adaptive Equipment Used: Reacher;Sock aid; Long handled shoe horn    Toileting  Toileting Assistance: Independent  Bladder Hygiene: Independent    Cognitive Retraining  Safety/Judgement: Fall prevention      Pain:  Pain level pre-treatment: 3/10, RLE  Pain level post-treatment: 10/10, RLE with movement  Pain Intervention(s): Medication (see MAR); Response to intervention: See doc flow    Activity Tolerance:   Patient demonstrated good activity tolerance during OT evaluation. Please refer to the flowsheet for vital signs taken during this treatment. After treatment:   [x]  Patient left in no apparent distress sitting up in chair  []  Patient left in no apparent distress in bed  [x]  Call bell left within reach  [x]  Nursing notified  []  Caregiver present  []  Bed alarm activated    COMMUNICATION/EDUCATION:   [x]      Role of Occupational Therapy in the acute care setting  [x]      Home safety education was provided and the patient/caregiver indicated understanding. [x]      Patient/family have participated as able and agree with findings and recommendations. []      Patient is unable to participate in plan of care at this time.     Thank you for this referral.  Angely Signs, OTR/L  Time Calculation: 40 mins      Eval Complexity: History: LOW Complexity : Brief history review ; Examination: LOW Complexity : 1-3 performance deficits relating to physical, cognitive , or psychosocial skils that result in activity limitations and / or participation restrictions ;    Decision Making:LOW Complexity : No comorbidities that affect functional and no verbal or physical assistance needed to complete eval tasks

## 2020-02-06 NOTE — PROGRESS NOTES
Problem: Falls - Risk of  Goal: *Absence of Falls  Description  Document Jenny Abel Fall Risk and appropriate interventions in the flowsheet.   Outcome: Progressing Towards Goal  Note: Fall Risk Interventions:  Mobility Interventions: Patient to call before getting OOB         Medication Interventions: Patient to call before getting OOB, Teach patient to arise slowly         History of Falls Interventions: Door open when patient unattended         Problem: Pain  Goal: *Control of Pain  Outcome: Progressing Towards Goal     Problem: Patient Education: Go to Patient Education Activity  Goal: Patient/Family Education  Outcome: Progressing Towards Goal

## 2020-02-06 NOTE — PROGRESS NOTES
Ortho    Pt. Seen and evaluated. Doing well, up in chair, pain well controlled  Denies cp, sob, abd pain    Visit Vitals  BP 95/60 (BP 1 Location: Right arm, BP Patient Position: At rest)   Pulse 73   Temp 98 °F (36.7 °C)   Resp 16   Ht 5' 8\" (1.727 m)   Wt 213 lb (96.6 kg)   SpO2 95%   BMI 32.39 kg/m²       right total hip replacement  right hip Woundclean, dry, no drainage  Sensory intact to LT  Motor intact  nv intact  Neg calf tenderness. Labs. CBC  @  CBC:   Lab Results   Component Value Date/Time    WBC 6.7 01/20/2020 07:55 AM    RBC 4.46 01/20/2020 07:55 AM    HGB 13.2 01/20/2020 07:55 AM    HCT 38.7 01/20/2020 07:55 AM    PLATELET 733 39/69/0238 07:55 AM    and BMP:   Lab Results   Component Value Date/Time    Glucose 95 01/20/2020 07:55 AM    Sodium 142 01/20/2020 07:55 AM    Potassium 4.2 01/20/2020 07:55 AM    Chloride 106 01/20/2020 07:55 AM    CO2 31 01/20/2020 07:55 AM    BUN 16 01/20/2020 07:55 AM    Creatinine 0.98 01/20/2020 07:55 AM    Calcium 9.4 01/20/2020 07:55 AM   @  Coagulation  Lab Results   Component Value Date    INR 1.1 02/06/2020    APTT 33.9 01/20/2020      Basic Metabolic Profile  Lab Results   Component Value Date     01/20/2020    CO2 31 01/20/2020    BUN 16 01/20/2020         Assesment:rightOrthopedic / Rheumatologic: Total Hip Replacement  Past Medical History:   Diagnosis Date    Arthritis     Fibromyalgia     Hypertension     Thyroid disease      ASA: 2    Pt is status post joint replacement and at risk for bleeding, blood clots, and infection. Plan:  Coumadin, PT, IV fluid bolus, DC to home if cleared by PT and ok with medicine.

## 2020-02-06 NOTE — PROGRESS NOTES
Mobility Intervention:       [] Pt dangled at edge of bed    [] Pt assisted OOB to bedside commode    [] Pt assisted OOB to chair    [] Pt ambulated to bathroom    [] Patient was ambulated in room/hallway    Assistive Device Utilized:       [] Rolling walker   [] Crutches   [] Straight Cane   [] Knee immobilizer   [] IV pole    After Mobilization:     [] Patient left in no apparent distress sitting up in chair  [] Patient left in no apparent distress in bed  [] Call bell left within reach  [] SCDs on & machine turned on  [] Ice applied  [] RN notified  [] Caregiver present  [] Bed alarm activated    Reason patient not mobilized:      [x] Leg is too weak     [] Nausea/vomiting   [] Low blood pressure   [] Drowsy/lethargic    Pain Rating:     [] 0  [] 1  Assistive Device:        [] 2  [] 3  [] 4  [] 5  [] 6  Assistive Device:        [] 7  [] 8  [] 9  [] 10    Comments:

## 2020-02-06 NOTE — DISCHARGE INSTRUCTIONS
DISCHARGE SUMMARY from Nurse    PATIENT INSTRUCTIONS:    After general anesthesia or intravenous sedation, for 24 hours or while taking prescription Narcotics:  · Limit your activities  · Do not drive and operate hazardous machinery  · Do not make important personal or business decisions  · Do  not drink alcoholic beverages  · If you have not urinated within 8 hours after discharge, please contact your surgeon on call. Report the following to your surgeon:  · Excessive pain, swelling, redness or odor of or around the surgical area  · Temperature over 100.5  · Nausea and vomiting lasting longer than 4 hours or if unable to take medications  · Any signs of decreased circulation or nerve impairment to extremity: change in color, persistent  numbness, tingling, coldness or increase pain  · Any questions    What to do at Home:  Recommended activity: Activity as tolerated, follow instructions given by physical and occupational therapist.    If you experience any of the following symptoms ,coolness,numb or tingling feeling of legs/foot, please follow up with . *  Please give a list of your current medications to your Primary Care Provider. *  Please update this list whenever your medications are discontinued, doses are      changed, or new medications (including over-the-counter products) are added. *  Please carry medication information at all times in case of emergency situations. These are general instructions for a healthy lifestyle:    No smoking/ No tobacco products/ Avoid exposure to second hand smoke  Surgeon General's Warning:  Quitting smoking now greatly reduces serious risk to your health.     Obesity, smoking, and sedentary lifestyle greatly increases your risk for illness    A healthy diet, regular physical exercise & weight monitoring are important for maintaining a healthy lifestyle    You may be retaining fluid if you have a history of heart failure or if you experience any of the following symptoms:  Weight gain of 3 pounds or more overnight or 5 pounds in a week, increased swelling in our hands or feet or shortness of breath while lying flat in bed. Please call your doctor as soon as you notice any of these symptoms; do not wait until your next office visit. Patient armband removed and shredded. MyChart Activation    Thank you for requesting access to Bluetest. Please follow the instructions below to securely access and download your online medical record. Bluetest allows you to send messages to your doctor, view your test results, renew your prescriptions, schedule appointments, and more. How Do I Sign Up? 1. In your internet browser, go to [email or web address]. 2. Click on the First Time User? Click Here link in the Sign In box. You will see the New Member Sign Up page. 3. Enter your Bluetest Access Code exactly as it appears below. You will not need to use this code after youve completed the sign-up process. If you do not sign up before the expiration date, you must request a new code. Bluetest Access Code: [facility]  Expires: 3/2/2020  4:36 PM (This is the date your Bluetest access code will )    4. Enter the last four digits of your Social Security Number (xxxx) and Date of Birth (mm/dd/yyyy) as indicated and click Submit. You will be taken to the next sign-up page. 5. Create a Bluetest ID. This will be your Bluetest login ID and cannot be changed, so think of one that is secure and easy to remember. 6. Create a Bluetest password. You can change your password at any time. 7. Enter your Password Reset Question and Answer. This can be used at a later time if you forget your password. 8. Enter your e-mail address. You will receive e-mail notification when new information is available in [address] 9. Click Sign Up. You can now view and download portions of your medical record.   10. Click the Download Summary menu link to download a portable copy of your medical information. Additional Information    If you have questions, please visit the Frequently Asked Questions section of the Sitrion website at [email or web address] Remember, Sitrion is NOT to be used for urgent needs. For medical emergencies, dial 911. The discharge information has been reviewed with the patient. The patient verbalized understanding. Discharge medications reviewed with the patient and appropriate educational materials and side effects teaching were provided.   ___________________________________________________________________________________________________________________________________

## 2020-02-06 NOTE — PROGRESS NOTES
Patient is not available to be assessed at this time. No family at bedside.     [facility]   Board Certified [address] [place] [address]   [phone]

## 2020-02-06 NOTE — PROGRESS NOTES
Bedside shift change report given to Nina Nichole (oncoming nurse) by Soham and Sabino (offgoing nurse). Report included the following information SBAR, Kardex, Procedure Summary, Intake/Output and MAR.

## 2020-02-06 NOTE — PROGRESS NOTES
Juan Fraser 63 [address] rounded on post hip replacement. Patient educated: Activity:  OOB for all meals, walk every hour to prevent blood clots & help with stiffness  Follow hip precautions (no bending over, no crossing legs, no turning toes inward)  Put pillow under whole leg to help with swelling. VTE prophylaxis:   Use SCD pumps except when walking. Ankle pumps 10 times an hour at hospital & home. Take blood thinner medication as ordered by surgeon. Do not skip a dose. Pain Control:  Pain medications side effects discussed. Wean off narcotics ASAP. Use Tylenol ( 3000 mg/24 hours) , ice, distraction, moving, & change position to help with pain. Rest between activity. Raise leg up on pillows. Don't get nauseated. Eat a snack before taking pain medication    Do not get constipated: take stool softener/mild laxative daily while on narcotics. Incentive Spirometry:    Use of incentive spirometer 10 x/hr. Demonstration  1500 ml x 3  Wound Care: Dressing dry and intact. I  Instructed patient on how to manage dressing and/or incision per MD protocol. Make sure dressing is dry and intact at all times. No lotions, powders, creams to surgical leg. .    Diet:   Eat for healing. Protein heals bone/muscle. Drink 8 glasses of water a day. Patient Safety:   Call light & belongings in reach. Call for help when want to walk or get OOB. Educational material given. Patient agreed to continue doing everything at home to prevent complications and have a successful recovery. Patient verbalizes how to manage their wound, use incentive spirometer, do ankle pumping, taking medication to prevent constipation, drinking lots of fluids and eating protein, and the importance of taking to anticoagulant per physician instruction. Patient  verbalized understand. Given the opportunity for asking questions.

## 2020-02-06 NOTE — PROGRESS NOTES
Reason for Admission:  Osteoarthritis of right hip, unspecified osteoarthritis type [M16.11]  Hip arthritis [M16.10]  Hip dysplasia [Q65.89]                 RRAT Score:    6%            Plan for utilizing home health:    Pt agreeable to [facility], Referral sent. Likelihood of Readmission:   LOW                         Transition of Care Plan:              Initial assessment completed with patient. Cognitive status of patient: oriented to time, place, person and situation. Face sheet information confirmed:  yes. . This patient lives in a single family home with sister. Patient is able to navigate steps as needed. Prior to hospitalization, patient was considered to be independent with ADLs/IADLS : yes . Patient has a current ACP document on file: no  The pt will need Medicaid transportation to transport patient home upon discharge. The patient already has [place], Shower chair, [facility] medical equipment available in the home. Patient is not currently active with home health. Patient has not stayed in a skilled nursing facility or rehab. This patient is on dialysis :no    List of available Home Health agencies were provided and reviewed with the patient prior to discharge. Freedom of choice signed: yes, for [facility], [facility]. Currently, the discharge plan is Home with [address] [place] [address] Braden Perry. The patient states that she can obtain her medications from the pharmacy, and take her medications as directed. Patient's current insurance is [employer]. Care Management Interventions  PCP Verified by CM:  Yes  Palliative Care Criteria Met (RRAT>21 & CHF Dx)?: No  Mode of Transport at Discharge: Self  Transition of Care Consult (CM Consult): Home Health, Discharge [place]  [address] [address]: Yes  Discharge Durable Medical Equipment: Yes  Physical Therapy Consult: Yes  Occupational Therapy Consult: Yes  Speech Therapy Consult: No  Current Support Network: Family Lives Nearby  Confirm Follow Up Transport: Other (see comment)(Medicaid Transportation)  The Patient and/or Patient Representative was Provided with a Choice of Provider and Agrees with the Discharge Plan?: Yes  Freedom of Choice List was Provided with Basic Dialogue that Supports the Patient's Individualized Plan of Care/Goals, Treatment Preferences and Shares the Quality Data Associated with the Providers?: Yes  Discharge Location  Discharge Placement: Home with home health        [address] RN BSN

## 2020-02-06 NOTE — PROGRESS NOTES
Problem: Mobility Impaired (Adult and Pediatric)  Goal: *Acute Goals and Plan of Care (Insert Text)  Description  Physical Therapy Goals  Initiated 2/5/2020 and to be accomplished within 7 day(s)  1. Patient will move from supine to sit and sit to supine  in bed with supervision/set-up. 2.  Patient will transfer from bed to chair and chair to bed with supervision/set-up using the least restrictive device. 3.  Patient will perform sit to stand with supervision/set-up. 4.  Patient will ambulate with supervision/set-up for 100 feet with the least restrictive device. PLOF: Patient reports she was independent with self-care and ambulates household distances with RW. She uses WC/ scooter in community. Pt lives in two story home with friend, with first floor set up. 2/6/2020 1145 by Heydi Mcgee, PAM  Outcome: Progressing Towards Goal  2/6/2020 0937 by Heydi Mcgee PTA  Outcome: Progressing Towards Goal   PHYSICAL THERAPY TREATMENT    Patient: Aliyah Peacock (23 y.o. 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 Pt able to recall all precautions w/ time, maintaining well t/o. Pt made great functional progress this visit compared to functional ability last visit req SBA t/o. Pt stood from recliner w/ good carryover for proper sequencing and amb w/ RW from room to quinn. Pt ambulates w/ a step to gait pattern w/ right LE externally rotated. When cued to fix stance, pt voiced inability as it is painful and tight, voicing also that is her PLOF.  When exiting room, pt instructed to step over object at doorway to mimic entering threshold at doorway at home. Pt displayed good balance and stability w/ support of walker out front when properly placing it to enter. Pt amb for a total of 100' this visit w/ RW at a slowed, step to pace w/ inability to progress to a reciprocal pattern and returned to room. Step training not necessary at this time as pt has 1st floor set up in home. Pt positioned self in recliner w/ all needs in reach, provided further education on safety in home to cont to use RW at this time vs rollator and utilize home therapy to safely navigate steps and for pacing. Pt left in room safely w/ nursing in room. From a PT standpoint, pt is safe to return home and would benefit from HHPT. Progression toward goals: good   [x]      Improving appropriately and progressing toward goals  []      Improving slowly and progressing toward goals  []      Not making progress toward goals and plan of care will be adjusted     PLAN:  Patient continues to benefit from skilled intervention to address the above impairments. Continue treatment per established plan of care. Discharge Recommendations:  Home Health  Further Equipment Recommendations for Discharge: has RW would benefit from [address]:   Patient stated If I go out of my house, he either pushes me in a WC or gets the scooter for me if the store has one.     OBJECTIVE DATA SUMMARY:   Critical Behavior:  Neurologic State: Alert  Orientation Level: Oriented X4  Cognition: Follows commands  Safety/Judgement: Fall prevention  Functional Mobility Training:  Transfers:  Sit to Stand: Stand-by assistance  Stand to Sit: Stand-by assistance  Balance:  Sitting: Intact  Sitting - Static: Good (unsupported)  Sitting - Dynamic: Fair (occasional)  Standing: Impaired; With support  Standing - Static: Good  Standing - Dynamic : Fair(+)   Range Of Motion:   AROM: Within functional limits  Ambulation/Gait Training:  Distance (ft): 100 Feet (ft)  Assistive Device: Walker, rolling  Ambulation - Level of Assistance: Stand-by assistance  Gait Abnormalities: Decreased step clearance; Step to gait(ER of right hip )  Base of Support: Center of gravity altered; Widened  Speed/Jaki: Slow  Therapeutic Exercises:       EXERCISE   Sets   Reps   Active Active Assist   Passive Self ROM   Comments   Ankle Pumps 1 10  [x] [] [] []    Quad Sets/Glut Sets 1 10  [x] [] [] [] Hold for 5 secs   Seated hip ABD 1 10 [x] [] [] []                                                                                        Pain:  Did not voice pain level despite mult attempts, voiced it aches     Activity Tolerance:   Good   Please refer to the flowsheet for vital signs taken during this treatment. After treatment:   [x] Patient left in no apparent distress sitting up in chair  [] Patient left in no apparent distress in bed  [x] Call bell left within reach  [x] Nursing notified  [x] Nurse  present  [] Bed alarm activated  [] SCDs applied      COMMUNICATION/EDUCATION:   [x]         Role of Physical Therapy in the acute care setting. [x]         Fall prevention education was provided and the patient/caregiver indicated understanding. [x]         Patient/family have participated as able in working toward goals and plan of care. [x]         Patient/family agree to work toward stated goals and plan of care. []         Patient understands intent and goals of therapy, but is neutral about his/her participation.   []         Patient is unable to participate in stated goals/plan of care: ongoing with therapy staff.  []         Other:        Lawerance Pillion, PTA   Time Calculation: 24 mins

## 2020-02-06 NOTE — PROGRESS NOTES
Hospitalist Progress Note    Patient: Kristopher Vazquez Age: 60 y.o. : 1955 MR#: [id number] SSN: [id number]  Date/Time: 2020 9:30 AM    DOA: 2020  PCP: Daniel Oviedo MD    Subjective:     Patient found sitting in chair, no complaint. Tolerates coumadin. Education on coumadin use provided. PT/OT ok for home health. BP is controlled, she has resumed home medications. Wound dressing intact, no bleeding  She has home help     Interval Hospital Course:  PROCEDURES PERFORMED:  1. Difficult right hip replacement using the DePuy S-ROM system using a 52 mm G[employer]ion [employer] cup with 3-screw augmentation, neutral lateralized +4 liner 36, a 16 x 11 x 150 36 standard neck S-ROM femoral component, a 16 B small triangle, and a +6 ceramic femoral head. 2.  Removal of ankylosing osteophytosis. 3.  Subtotal capsulectomy. 4.  Rebalancing of hip.  5.  20 degrees of retroversion applied to the femoral component. 6.  DBX bone grafting to superior posterior acetabular deficit. ROS:  no fever/chills, no headache, no dizziness, no facial pain, no sinus congestion,   No swallowing pain, No chest pain, no palpitation, no shortness of breath, no abd pain,  No diarrhea, no urinary complaint, no leg pain or swelling       Assessment/Plan:   1. Right hip OA, s/p total hip replacement  2. HTN  3. Hypothyroidism  4. Morbid obesity   5. Large hiatal hernia with atypical chest pain. ?GERD     Education provided for coumadin use, avoid leafy vegetable. Follow up with PCP for outpt care. Wound care per ortho. DVT prophylaxis per Ortho  Pain controlled. Resume home medications, can have home PT/OT per recommended.      Full code     Additional Notes:    Time spent >30 minutes    Case discussed with:  [x]Patient  []Family  [x]Nursing  [x]Case Management  DVT Prophylaxis:  []Lovenox  []Hep SQ  [x]SCDs  [x]Coumadin   []On Heparin gtt    Signed By: Jos Blake MD     2020 9:30 AM              Objective:   VS: Visit Vitals  BP 95/60 (BP 1 Location: Right arm, BP Patient Position: At rest)   Pulse 73   Temp 98 °F (36.7 °C)   Resp 16   Ht 5' 8\" (1.727 m)   Wt 96.6 kg (213 lb)   SpO2 95%   BMI 32.39 kg/m²      Tmax/24hrs: Temp (24hrs), Av.5 °F (36.4 °C), Min:96.5 °F (35.8 °C), Max:98.2 °F (36.8 °C)      Intake/Output Summary (Last 24 hours) at 2020 0930  Last data filed at 2020 0732  Gross per 24 hour   Intake 3165 ml   Output 1450 ml   Net 1715 ml     General:  Cooperative, Not in acute distress, speaks in full sentence while in bed  HEENT: PERRL, EOMI, supple neck, no JVD, dry oral mucosa  Cardiovascular: S1S2 regular, no rub/gallop   Pulmonary: Clear air entry bilaterally, no wheezing, no crackle  GI:  Soft, non tender, non distended, +bs, no guarding   Extremities:  No pedal edema, +distal pulses appreciated   +right hip bandage intact, no bleeding   Neuro: AOx3, moving all extremities, no gross deficit.      Additional:       Current Facility-Administered Medications   Medication Dose Route Frequency    0.9% sodium chloride infusion 500 mL  500 mL IntraVENous ONCE    [START ON 2020] Warfarin - Physciain To Dose   Other QPM    amLODIPine (NORVASC) tablet 5 mg  5 mg Oral DAILY    levothyroxine (SYNTHROID) tablet 150 mcg  150 mcg Oral ACB    0.9% sodium chloride infusion  100 mL/hr IntraVENous CONTINUOUS    sodium chloride (NS) flush 5-40 mL  5-40 mL IntraVENous Q8H    sodium chloride (NS) flush 5-40 mL  5-40 mL IntraVENous PRN    acetaminophen (TYLENOL) tablet 1,000 mg  1,000 mg Oral Q6H    oxyCODONE IR (ROXICODONE) tablet 10-15 mg  10-15 mg Oral Q3H PRN    celecoxib (CELEBREX) capsule 200 mg  200 mg Oral BID    naloxone (NARCAN) injection 0.4 mg  0.4 mg IntraVENous PRN    flumazeniL (ROMAZICON) 0.1 mg/mL injection 0.2 mg  0.2 mg IntraVENous PRN    ferrous sulfate tablet 325 mg  1 Tab Oral BID WITH MEALS    ondansetron (ZOFRAN) injection 4 mg  4 mg IntraVENous Q4H PRN    senna-docusate (PERICOLACE) 8.6-50 mg per tablet 1 Tab  1 Tab Oral BID    zolpidem (AMBIEN) tablet 5 mg  5 mg Oral QHS PRN    pregabalin (LYRICA) capsule 25 mg  25 mg Oral BID    losartan (COZAAR) tablet 100 mg  100 mg Oral DAILY    And    hydroCHLOROthiazide (MICROZIDE) capsule 12.5 mg  12.5 mg Oral DAILY            Lab/Data Review:  Labs: Results:       Chemistry Lab Results   Component Value Date/Time    Sodium 142 01/20/2020 07:55 AM    Potassium 4.2 01/20/2020 07:55 AM    Chloride 106 01/20/2020 07:55 AM    CO2 31 01/20/2020 07:55 AM    Anion gap 5 01/20/2020 07:55 AM    Glucose 95 01/20/2020 07:55 AM    BUN 16 01/20/2020 07:55 AM    Creatinine 0.98 01/20/2020 07:55 AM    BUN/Creatinine ratio 16 01/20/2020 07:55 AM    GFR est AA >60 01/20/2020 07:55 AM    GFR est non-AA 58 (L) 01/20/2020 07:55 AM    Calcium 9.4 01/20/2020 07:55 AM        CBC w/Diff Lab Results   Component Value Date/Time    WBC 6.7 01/20/2020 07:55 AM    HGB 13.2 01/20/2020 07:55 AM    HCT 38.7 01/20/2020 07:55 AM    PLATELET 687 33/55/7969 07:55 AM    MCV 86.8 01/20/2020 07:55 AM        Coagulation Recent Labs     02/06/20  0444 02/05/20  0903   PTP 14.1 12.5   INR 1.1 1.0       Iron/Ferritin No results found for: IRON, FE, TIBC, IBCT, PSAT, FERR    BNP    Cardiac Enzymes No results found for: CPK, RCK1, RCK2, RCK3, RCK4, CKMB, CKNDX, CKND1, TROPT, TROIQ, BNPP, BNP     Lactic Acid    Thyroid Studies          All Micro Results     None            Images:    CT (Most Recent). XRAY (Most Recent) XR Results (most recent):  Results from Hospital Encounter encounter on 02/05/20   XR HIP RT W OR WO PELV 2-3 VWS    Narrative EXAM: Right hip X-Ray    INDICATION:  post op ALFREDO New arthroplasty    TECHNIQUE: AP and crosstable lateral views of the right hip    COMPARISON: None    FINDINGS:   A new right total hip arthroplasty is present. No fracture or dislocation  appreciated. Changes are noted in the soft tissues consistent with recent  surgery. Impression IMPRESSION:  1.  New right total hip  arthroplasty   without complication. EKG No results found for this or any previous visit.      2D ECHO

## 2020-02-06 NOTE — PROGRESS NOTES
Problem: Mobility Impaired (Adult and Pediatric)  Goal: *Acute Goals and Plan of Care (Insert Text)  Description  Physical Therapy Goals  Initiated 2/5/2020 and to be accomplished within 7 day(s)  1. Patient will move from supine to sit and sit to supine  in bed with supervision/set-up. 2.  Patient will transfer from bed to chair and chair to bed with supervision/set-up using the least restrictive device. 3.  Patient will perform sit to stand with supervision/set-up. 4.  Patient will ambulate with supervision/set-up for 100 feet with the least restrictive device. PLOF: Patient reports she was independent with self-care and ambulates household distances with RW. She uses WC/ scooter in community. Pt lives in two story home with friend, with first floor set up. Outcome: Progressing Towards Goal   PT tx performed at 858. Pt safely able to perform all mobility tasks and is safe to return home. Home health recommended. Full note to follow.     OUMAR Verde

## 2020-02-06 NOTE — HOME CARE
Discharge noted for today. Received home health referral for [facility] for SN and PT - Reading hip protocol. Sergey Keller LPN with patient, explained services and answered all questions. Demographics verified. Order processed and emailed to central office. Patient has the following DME: cane, rolling walker, rollator, bedside commode, and shower chair.  Crescencio Reyna, [facility] Liaison

## 2020-02-06 NOTE — PROGRESS NOTES
Mobility Intervention:       [] Pt dangled at edge of bed    [x] Pt assisted OOB to bedside commode    [] Pt assisted OOB to chair    [] Pt ambulated to bathroom    [] Patient was ambulated in room/hallway    Assistive Device Utilized:       [x] Rolling walker   [] Crutches   [] Straight Cane   [] Knee immobilizer   [] IV pole    After Mobilization:     [] Patient left in no apparent distress sitting up in chair  [x] Patient left in no apparent distress in bed  [x] Call bell left within reach  [x] SCDs on & machine turned on  [x] Ice applied  [] RN notified  [] Caregiver present  [] Bed alarm activated    Reason patient not mobilized:      [] Patient refused   [] Nausea/vomiting   [] Low blood pressure   [] Drowsy/lethargic    Pain Rating:     [] 0  [] 1  Assistive Device:        [x] 2  [] 3  [] 4  [] 5  [] 6  Assistive Device:        [] 7  [] 8  [] 9  [] 10    Comments:

## 2020-02-07 ENCOUNTER — HOME CARE VISIT (OUTPATIENT)
Dept: SCHEDULING | Facility: HOME HEALTH | Age: 65
End: 2020-02-07
Payer: MEDICAID

## 2020-02-07 ENCOUNTER — TELEPHONE (OUTPATIENT)
Dept: ORTHOPEDIC SURGERY | Facility: CLINIC | Age: 65
End: 2020-02-07

## 2020-02-07 VITALS
OXYGEN SATURATION: 98 % | SYSTOLIC BLOOD PRESSURE: 126 MMHG | HEART RATE: 114 BPM | DIASTOLIC BLOOD PRESSURE: 70 MMHG | TEMPERATURE: 99 F

## 2020-02-07 VITALS
SYSTOLIC BLOOD PRESSURE: 126 MMHG | DIASTOLIC BLOOD PRESSURE: 70 MMHG | RESPIRATION RATE: 20 BRPM | HEART RATE: 114 BPM | TEMPERATURE: 99 F | OXYGEN SATURATION: 98 %

## 2020-02-07 PROCEDURE — G0299 HHS/HOSPICE OF RN EA 15 MIN: HCPCS

## 2020-02-07 PROCEDURE — G0151 HHCP-SERV OF PT,EA 15 MIN: HCPCS

## 2020-02-07 PROCEDURE — 400013 HH SOC

## 2020-02-07 NOTE — TELEPHONE ENCOUNTER
Post op pt called wanting her prescriptions from the hopsital to be called in instead of using the printed ones she received upon discharge. per discussion with the nurse, expressed to patient we're unable to accommodate due to regulations. Pt understood and will have a friend or someone that helps her go to her pharmacy for her.     No further action rerquired

## 2020-02-07 NOTE — ROUTINE PROCESS
Patient d/c'd home. No distress noted. Dressing to Rt Hip is D&I. No neurovascular deficits noted. Discharge instructions and prescriptions given to the patient.

## 2020-02-08 ENCOUNTER — HOME CARE VISIT (OUTPATIENT)
Dept: SCHEDULING | Facility: HOME HEALTH | Age: 65
End: 2020-02-08
Payer: MEDICAID

## 2020-02-08 VITALS
DIASTOLIC BLOOD PRESSURE: 74 MMHG | OXYGEN SATURATION: 96 % | TEMPERATURE: 98 F | SYSTOLIC BLOOD PRESSURE: 116 MMHG | HEART RATE: 102 BPM

## 2020-02-08 PROCEDURE — G0157 HHC PT ASSISTANT EA 15: HCPCS

## 2020-02-09 ENCOUNTER — HOME CARE VISIT (OUTPATIENT)
Dept: SCHEDULING | Facility: HOME HEALTH | Age: 65
End: 2020-02-09
Payer: MEDICAID

## 2020-02-09 VITALS
HEART RATE: 106 BPM | OXYGEN SATURATION: 97 % | SYSTOLIC BLOOD PRESSURE: 126 MMHG | TEMPERATURE: 98.3 F | DIASTOLIC BLOOD PRESSURE: 78 MMHG

## 2020-02-09 PROCEDURE — G0157 HHC PT ASSISTANT EA 15: HCPCS

## 2020-02-09 PROCEDURE — G0299 HHS/HOSPICE OF RN EA 15 MIN: HCPCS

## 2020-02-10 ENCOUNTER — TELEPHONE (OUTPATIENT)
Dept: ORTHOPEDIC SURGERY | Facility: CLINIC | Age: 65
End: 2020-02-10

## 2020-02-10 ENCOUNTER — HOME CARE VISIT (OUTPATIENT)
Dept: SCHEDULING | Facility: HOME HEALTH | Age: 65
End: 2020-02-10
Payer: MEDICAID

## 2020-02-10 ENCOUNTER — HOME CARE VISIT (OUTPATIENT)
Dept: HOME HEALTH SERVICES | Facility: HOME HEALTH | Age: 65
End: 2020-02-10
Payer: MEDICAID

## 2020-02-10 PROCEDURE — A6259 TRANSPARENT FILM > 48 SQ IN: HCPCS

## 2020-02-10 PROCEDURE — G0157 HHC PT ASSISTANT EA 15: HCPCS

## 2020-02-10 PROCEDURE — G0299 HHS/HOSPICE OF RN EA 15 MIN: HCPCS

## 2020-02-10 PROCEDURE — A6199 ALGINATE DRSG WOUND FILLER: HCPCS

## 2020-02-10 RX ORDER — CEPHALEXIN 500 MG/1
500 CAPSULE ORAL 4 TIMES DAILY
Qty: 28 CAP | Refills: 0 | Status: SHIPPED | OUTPATIENT
Start: 2020-02-10 | End: 2022-02-23 | Stop reason: ALTCHOICE

## 2020-02-10 NOTE — TELEPHONE ENCOUNTER
Patient called and asked if a nurse could give her a call regarding drainage in her incision.        Patient tel: [phone]

## 2020-02-10 NOTE — TELEPHONE ENCOUNTER
Called and discussed    Hold coumadin  Will send prophylactic abx to pharmacy  Scheduled to see up on 2/13

## 2020-02-10 NOTE — TELEPHONE ENCOUNTER
Constance Mandel patient's home health physical therapist called and asked if a nurse could give her a call regarding patient , she stated that there is a lot of yellow-brown drainage around patients incision. Cheryl Linares also stated that a nurse is on their way to change the patients bandages.      Brandee Rodriguez: [phone]

## 2020-02-10 NOTE — TELEPHONE ENCOUNTER
Hermelinda from [facility] called and said :    PT 17.7    INR 1.5    Coumadin on hold for two days. Patient has 3 mg tablets at home. Matilda Yusuf said that the surgery wound draining a lot. That it is a Clear yellow liquid. That she has placed an under pad. Hermelinda tel. [phone].

## 2020-02-11 ENCOUNTER — HOME CARE VISIT (OUTPATIENT)
Dept: SCHEDULING | Facility: HOME HEALTH | Age: 65
End: 2020-02-11
Payer: MEDICAID

## 2020-02-11 VITALS
TEMPERATURE: 98.2 F | HEART RATE: 102 BPM | DIASTOLIC BLOOD PRESSURE: 89 MMHG | OXYGEN SATURATION: 97 % | SYSTOLIC BLOOD PRESSURE: 134 MMHG

## 2020-02-11 VITALS
TEMPERATURE: 98 F | HEART RATE: 98 BPM | SYSTOLIC BLOOD PRESSURE: 120 MMHG | RESPIRATION RATE: 16 BRPM | OXYGEN SATURATION: 98 % | DIASTOLIC BLOOD PRESSURE: 72 MMHG

## 2020-02-11 PROCEDURE — G0157 HHC PT ASSISTANT EA 15: HCPCS

## 2020-02-11 NOTE — TELEPHONE ENCOUNTER
Message given to the [facility] to contact patient to schedule an appointment to see CONRADO Thomason tomorrow, Wednesday 2/12/2020 @ .

## 2020-02-11 NOTE — TELEPHONE ENCOUNTER
Hermelinda with [facility] called stating that the patient called her stating she is supposed to come in tomorrow, I did advise Consuelo Connors that the patient is scheduled for Thursday 2/13/2020. Consuelo Connors stated that the patient is getting different information than what she has been given and the patient is becoming confused and frustrated. Consuelo Connors asked if the office can call the patient and give her correct information.  Please advise patient at [phone]

## 2020-02-11 NOTE — TELEPHONE ENCOUNTER
Left message on Hermelinda's voicemail notifying her of coumadin/INR recheck instructions per CONRADO Olmstead and to call us back with any further questions.

## 2020-02-11 NOTE — TELEPHONE ENCOUNTER
Hold coumadin until f/u appt. Have her come see me tomorrow for an appt. Thanks. Change dressing as needed. Continue on abx.

## 2020-02-11 NOTE — TELEPHONE ENCOUNTER
02/11/2020 Patient called back as she is still having drainage and the bandage is half off. Also she is confused with reference to taking the Coumadin. She did take 1/2 tab this am as directed by the home health nurse. Is the home health nurse coming out today to change the bandage as no one has called her yet. Please call Annetta Carbone about the situtation of drainage and taking the Coumadin and the nurse coming out to change the bandage.  Please call patient at [phone]

## 2020-02-12 ENCOUNTER — HOME CARE VISIT (OUTPATIENT)
Dept: SCHEDULING | Facility: HOME HEALTH | Age: 65
End: 2020-02-12
Payer: MEDICAID

## 2020-02-12 ENCOUNTER — OFFICE VISIT (OUTPATIENT)
Dept: ORTHOPEDIC SURGERY | Facility: CLINIC | Age: 65
End: 2020-02-12

## 2020-02-12 VITALS
HEART RATE: 112 BPM | DIASTOLIC BLOOD PRESSURE: 89 MMHG | BODY MASS INDEX: 32.28 KG/M2 | RESPIRATION RATE: 18 BRPM | WEIGHT: 213 LBS | TEMPERATURE: 98.1 F | HEIGHT: 68 IN | OXYGEN SATURATION: 96 % | SYSTOLIC BLOOD PRESSURE: 127 MMHG

## 2020-02-12 VITALS
DIASTOLIC BLOOD PRESSURE: 91 MMHG | HEART RATE: 101 BPM | TEMPERATURE: 98.6 F | SYSTOLIC BLOOD PRESSURE: 136 MMHG | OXYGEN SATURATION: 97 %

## 2020-02-12 VITALS
RESPIRATION RATE: 20 BRPM | OXYGEN SATURATION: 97 % | DIASTOLIC BLOOD PRESSURE: 70 MMHG | HEART RATE: 100 BPM | SYSTOLIC BLOOD PRESSURE: 110 MMHG | TEMPERATURE: 98 F

## 2020-02-12 DIAGNOSIS — Z96.641 STATUS POST RIGHT HIP REPLACEMENT: Primary | ICD-10-CM

## 2020-02-12 DIAGNOSIS — Z98.890 STATUS POST INCISION AND DRAINAGE: ICD-10-CM

## 2020-02-12 DIAGNOSIS — R11.2 NAUSEA AND VOMITING, INTRACTABILITY OF VOMITING NOT SPECIFIED, UNSPECIFIED VOMITING TYPE: ICD-10-CM

## 2020-02-12 DIAGNOSIS — M25.551 RIGHT HIP PAIN: ICD-10-CM

## 2020-02-12 PROCEDURE — G0157 HHC PT ASSISTANT EA 15: HCPCS

## 2020-02-12 RX ORDER — HYDROCODONE BITARTRATE AND ACETAMINOPHEN 10; 325 MG/1; MG/1
1-2 TABLET ORAL
Qty: 42 TAB | Refills: 0 | Status: SHIPPED | OUTPATIENT
Start: 2020-02-12 | End: 2020-02-19

## 2020-02-12 RX ORDER — ONDANSETRON 4 MG/1
4 TABLET, FILM COATED ORAL
Qty: 30 TAB | Refills: 1 | Status: SHIPPED | OUTPATIENT
Start: 2020-02-12

## 2020-02-12 NOTE — PROGRESS NOTES
1. Have you been to the ER, urgent care clinic since your last visit? Hospitalized since your last visit? No    2. Have you seen or consulted any other health care providers outside of the [address] [place] [address] since your last visit? Include any pap smears or colon screening.  No

## 2020-02-12 NOTE — PROGRESS NOTES
[address] [place] [address]  [phone]           Patient: Spencer Sy                MRN: [id number]       SSN: [id number]  YOB: 1955        AGE: 60 y.o. SEX: female  Body mass index is 32.39 kg/m². PCP: Long Yen MD  02/12/20      This office note has been dictated. REVIEW OF SYSTEMS:  Constitutional: Negative for fever, chills, weight loss and malaise/fatigue. HENT: Negative. Eyes: Negative. Respiratory: Negative. Cardiovascular: Negative. Gastrointestinal: No bowel incontinence or constipation. Genitourinary: No bladder incontinence or saddle anesthesia. Skin: Negative. Neurological: Negative. Endo/Heme/Allergies: Negative. Psychiatric/Behavioral: Negative. Musculoskeletal: As per HPI above. Past Medical History:   Diagnosis Date    Arthritis     Fibromyalgia     Hypertension     Thyroid disease          Current Outpatient Medications:     cephALEXin (KEFLEX) 500 mg capsule, Take 1 Cap by mouth four (4) times daily. , Disp: 28 Cap, Rfl: 0    oxyCODONE-acetaminophen (PERCOCET) 7.5-325 mg per tablet, Take 1-2 Tabs by mouth every four (4) hours as needed for Pain for up to 7 days. Max Daily Amount: 12 Tabs., Disp: 56 Tab, Rfl: 0    docusate sodium (COLACE) 100 mg capsule, Take 1 Cap by mouth two (2) times a day for 90 days. , Disp: 60 Cap, Rfl: 2    warfarin (COUMADIN) 3 mg tablet, Take 1 Tab by mouth daily for 21 days. , Disp: 30 Tab, Rfl: 1    cephALEXin (KEFLEX) 500 mg capsule, Take 1 Cap by mouth four (4) times daily. , Disp: 8 Cap, Rfl: 0    levothyroxine (SYNTHROID) 150 mcg tablet, Take 150 mcg by mouth Daily (before breakfast). , Disp: , Rfl:     losartan-hydroCHLOROthiazide (HYZAAR) 100-12.5 mg per tablet, Take 1 Tab by mouth daily. , Disp: , Rfl:     amLODIPine (NORVASC) 5 mg tablet, Take 5 mg by mouth daily. , Disp: , Rfl:     Biotin 2,500 mcg cap, Take  by mouth., Disp: , Rfl:     Allergies   Allergen Reactions    Aspirin Nausea and Vomiting    Other Medication Not Reported This Time     Sidra    Patient states only allergic to aspirin       Social History     Socioeconomic History    Marital status: UNKNOWN     Spouse name: Not on file    Number of children: Not on file    Years of education: Not on file    Highest education level: Not on file   Occupational History    Not on file   Social Needs    Financial resource strain: Not on file    Food insecurity:     Worry: Not on file     Inability: Not on file    Transportation needs:     Medical: Not on file     Non-medical: Not on file   Tobacco Use    Smoking status: Never Smoker    Smokeless tobacco: Never Used   Substance and Sexual Activity    Alcohol use: Not Currently    Drug use: Never    Sexual activity: Not on file   Lifestyle    Physical activity:     Days per week: Not on file     Minutes per session: Not on file    Stress: Not on file   Relationships    Social connections:     Talks on phone: Not on file     Gets together: Not on file     Attends Latter day service: Not on file     Active member of club or organization: Not on file     Attends meetings of clubs or organizations: Not on file     Relationship status: Not on file    Intimate partner violence:     Fear of current or ex partner: Not on file     Emotionally abused: Not on file     Physically abused: Not on file     Forced sexual activity: Not on file   Other Topics Concern    Not on file   Social History Narrative    Not on file       Past Surgical History:   Procedure Laterality Date    HX HYSTERECTOMY      partial    HX THYROIDECTOMY             We did see Ms. Mcconnell today for followup with regards to her right hip replacement. The patient is now seven days status post surgery. We had a couple of messages regarding some serous drainage from the hip.   I did start her on an antibiotic and had her come in to see me today for further evaluation. She denies any fevers or chills. She reports she is doing well with physical therapy. She has had no night sweats and no weight loss. She is having some trouble with nausea with the pain medicine. She asked for something different. PHYSICAL EXAMINATION:  In general, the patient is alert and oriented x 3 in no acute distress. The patient is well-developed, well-nourished, with a normal affect. The patient is afebrile. Examination of the right hip reveals the skin is intact. The surgical wound is healing nicely. The staples are in place. She does have serous drainage noted on the dressing. No active drainage is noted. There is minimal swelling. There is no underlying seroma or hematoma present. There is no pain with rotation of the hip. Neurovascular status is intact to the lower extremity. There is no calf tenderness. There is a negative Madyson's sign. There are no signs for DVT present. ASSESSMENT:  Status post right hip replacement. PLAN:  At this point, the wound was cleaned with alcohol, and a new dressing was placed. She will continue on her prophylactic antibiotics. They will do dressing changes as needed. We will send a prescription in for Norco and Zofran to the pharmacy and see her back in one weeks' time for evaluation. She will call with any questions or concerns that shall arise.                   JR Kamran KUNZ, MAYCOL, ATC

## 2020-02-13 ENCOUNTER — HOME CARE VISIT (OUTPATIENT)
Dept: SCHEDULING | Facility: HOME HEALTH | Age: 65
End: 2020-02-13
Payer: MEDICAID

## 2020-02-13 VITALS — TEMPERATURE: 98.7 F | HEART RATE: 101 BPM | OXYGEN SATURATION: 94 %

## 2020-02-13 PROCEDURE — G0157 HHC PT ASSISTANT EA 15: HCPCS

## 2020-02-14 ENCOUNTER — HOME CARE VISIT (OUTPATIENT)
Dept: SCHEDULING | Facility: HOME HEALTH | Age: 65
End: 2020-02-14
Payer: MEDICAID

## 2020-02-14 ENCOUNTER — HOME CARE VISIT (OUTPATIENT)
Dept: HOME HEALTH SERVICES | Facility: HOME HEALTH | Age: 65
End: 2020-02-14
Payer: MEDICAID

## 2020-02-14 ENCOUNTER — TELEPHONE (OUTPATIENT)
Dept: ORTHOPEDIC SURGERY | Facility: CLINIC | Age: 65
End: 2020-02-14

## 2020-02-14 VITALS
SYSTOLIC BLOOD PRESSURE: 118 MMHG | DIASTOLIC BLOOD PRESSURE: 84 MMHG | TEMPERATURE: 97.9 F | HEART RATE: 99 BPM | RESPIRATION RATE: 20 BRPM | OXYGEN SATURATION: 98 %

## 2020-02-14 VITALS — OXYGEN SATURATION: 97 % | TEMPERATURE: 97.7 F | HEART RATE: 101 BPM

## 2020-02-14 VITALS — SYSTOLIC BLOOD PRESSURE: 118 MMHG | HEART RATE: 92 BPM | DIASTOLIC BLOOD PRESSURE: 78 MMHG | OXYGEN SATURATION: 97 %

## 2020-02-14 PROCEDURE — A6199 ALGINATE DRSG WOUND FILLER: HCPCS

## 2020-02-14 PROCEDURE — G0299 HHS/HOSPICE OF RN EA 15 MIN: HCPCS

## 2020-02-14 PROCEDURE — G0151 HHCP-SERV OF PT,EA 15 MIN: HCPCS

## 2020-02-14 PROCEDURE — A6213 FOAM DRG >16<=48 SQ IN W/BDR: HCPCS

## 2020-02-14 PROCEDURE — A6253 ABSORPT DRG > 48 SQ IN W/O B: HCPCS

## 2020-02-14 NOTE — TELEPHONE ENCOUNTER
[address] called ref the patient taking Coumadin. Patient seen 02/12/2020 and patient stated to Faisal La that Estelle Mendez told patient to stop the Coumadin. Please clarify and also is the home health nurse to do a PT.INR?   Please call McDonough back at [phone]

## 2020-02-14 NOTE — TELEPHONE ENCOUNTER
Yuliya patient home health nurse called and asked if patient could give her a call. Lucius Burrell stated there was a previous left by a nurse and they haven't received call yet regarding this.      Yuliya tel: [phone]

## 2020-02-15 ENCOUNTER — HOME CARE VISIT (OUTPATIENT)
Dept: SCHEDULING | Facility: HOME HEALTH | Age: 65
End: 2020-02-15
Payer: MEDICAID

## 2020-02-15 VITALS
SYSTOLIC BLOOD PRESSURE: 128 MMHG | OXYGEN SATURATION: 98 % | DIASTOLIC BLOOD PRESSURE: 96 MMHG | TEMPERATURE: 98.4 F | HEART RATE: 100 BPM

## 2020-02-15 PROCEDURE — G0157 HHC PT ASSISTANT EA 15: HCPCS

## 2020-02-16 ENCOUNTER — HOME CARE VISIT (OUTPATIENT)
Dept: SCHEDULING | Facility: HOME HEALTH | Age: 65
End: 2020-02-16
Payer: MEDICAID

## 2020-02-16 PROCEDURE — G0157 HHC PT ASSISTANT EA 15: HCPCS

## 2020-02-17 ENCOUNTER — HOME CARE VISIT (OUTPATIENT)
Dept: HOME HEALTH SERVICES | Facility: HOME HEALTH | Age: 65
End: 2020-02-17
Payer: MEDICAID

## 2020-02-17 ENCOUNTER — HOME CARE VISIT (OUTPATIENT)
Dept: SCHEDULING | Facility: HOME HEALTH | Age: 65
End: 2020-02-17
Payer: MEDICAID

## 2020-02-17 VITALS
SYSTOLIC BLOOD PRESSURE: 132 MMHG | HEART RATE: 92 BPM | RESPIRATION RATE: 16 BRPM | TEMPERATURE: 97.8 F | OXYGEN SATURATION: 95 % | DIASTOLIC BLOOD PRESSURE: 84 MMHG

## 2020-02-17 VITALS
OXYGEN SATURATION: 96 % | TEMPERATURE: 98.1 F | HEART RATE: 109 BPM | DIASTOLIC BLOOD PRESSURE: 86 MMHG | SYSTOLIC BLOOD PRESSURE: 140 MMHG

## 2020-02-17 LAB
INR BLD: 1.1 (ref 0.9–1.1)
PT POC: 13.8 SECONDS (ref 11.8–14.9)

## 2020-02-17 PROCEDURE — G0299 HHS/HOSPICE OF RN EA 15 MIN: HCPCS

## 2020-02-17 PROCEDURE — G0157 HHC PT ASSISTANT EA 15: HCPCS

## 2020-02-18 ENCOUNTER — HOME CARE VISIT (OUTPATIENT)
Dept: SCHEDULING | Facility: HOME HEALTH | Age: 65
End: 2020-02-18
Payer: MEDICAID

## 2020-02-18 VITALS
TEMPERATURE: 99.3 F | DIASTOLIC BLOOD PRESSURE: 76 MMHG | OXYGEN SATURATION: 98 % | HEART RATE: 90 BPM | SYSTOLIC BLOOD PRESSURE: 122 MMHG

## 2020-02-18 PROCEDURE — G0157 HHC PT ASSISTANT EA 15: HCPCS

## 2020-02-19 ENCOUNTER — HOME CARE VISIT (OUTPATIENT)
Dept: SCHEDULING | Facility: HOME HEALTH | Age: 65
End: 2020-02-19
Payer: MEDICAID

## 2020-02-19 VITALS
DIASTOLIC BLOOD PRESSURE: 82 MMHG | SYSTOLIC BLOOD PRESSURE: 142 MMHG | TEMPERATURE: 97.4 F | HEART RATE: 92 BPM | OXYGEN SATURATION: 96 %

## 2020-02-19 PROCEDURE — G0157 HHC PT ASSISTANT EA 15: HCPCS

## 2020-02-20 ENCOUNTER — HOME CARE VISIT (OUTPATIENT)
Dept: SCHEDULING | Facility: HOME HEALTH | Age: 65
End: 2020-02-20
Payer: MEDICAID

## 2020-02-20 ENCOUNTER — TELEPHONE (OUTPATIENT)
Dept: ORTHOPEDIC SURGERY | Facility: CLINIC | Age: 65
End: 2020-02-20

## 2020-02-20 ENCOUNTER — TELEPHONE (OUTPATIENT)
Dept: ORTHOPEDIC SURGERY | Age: 65
End: 2020-02-20

## 2020-02-20 ENCOUNTER — OFFICE VISIT (OUTPATIENT)
Dept: ORTHOPEDIC SURGERY | Facility: CLINIC | Age: 65
End: 2020-02-20

## 2020-02-20 ENCOUNTER — HOME CARE VISIT (OUTPATIENT)
Dept: HOME HEALTH SERVICES | Facility: HOME HEALTH | Age: 65
End: 2020-02-20
Payer: MEDICAID

## 2020-02-20 VITALS
TEMPERATURE: 96.7 F | BODY MASS INDEX: 33.13 KG/M2 | DIASTOLIC BLOOD PRESSURE: 83 MMHG | RESPIRATION RATE: 18 BRPM | OXYGEN SATURATION: 97 % | WEIGHT: 218.6 LBS | HEIGHT: 68 IN | SYSTOLIC BLOOD PRESSURE: 119 MMHG | HEART RATE: 78 BPM

## 2020-02-20 VITALS
TEMPERATURE: 99 F | DIASTOLIC BLOOD PRESSURE: 72 MMHG | OXYGEN SATURATION: 94 % | HEART RATE: 102 BPM | SYSTOLIC BLOOD PRESSURE: 140 MMHG

## 2020-02-20 VITALS
HEART RATE: 103 BPM | OXYGEN SATURATION: 97 % | SYSTOLIC BLOOD PRESSURE: 130 MMHG | TEMPERATURE: 97.9 F | DIASTOLIC BLOOD PRESSURE: 80 MMHG

## 2020-02-20 VITALS
DIASTOLIC BLOOD PRESSURE: 80 MMHG | OXYGEN SATURATION: 100 % | RESPIRATION RATE: 16 BRPM | SYSTOLIC BLOOD PRESSURE: 138 MMHG | HEART RATE: 96 BPM | TEMPERATURE: 97.2 F

## 2020-02-20 DIAGNOSIS — Z96.641 STATUS POST RIGHT HIP REPLACEMENT: Primary | ICD-10-CM

## 2020-02-20 DIAGNOSIS — M25.551 RIGHT HIP PAIN: ICD-10-CM

## 2020-02-20 DIAGNOSIS — Z98.890 STATUS POST INCISION AND DRAINAGE: ICD-10-CM

## 2020-02-20 PROCEDURE — G0151 HHCP-SERV OF PT,EA 15 MIN: HCPCS

## 2020-02-20 PROCEDURE — G0299 HHS/HOSPICE OF RN EA 15 MIN: HCPCS

## 2020-02-20 NOTE — LETTER
2/20/2020 10:51 AM 
 
Ms. Adan Barron [address] [address] Patient is unable to work s/p hip replacement. She is working with PT as she is unable to transition to a new home at this time. It is imperative for her healing to continue her current treatment plan. If you have any questions please call the office.  
 
Sincerely, 
 
 
Juice Still PA-C

## 2020-02-20 NOTE — PROGRESS NOTES
[address] [place] [address]  [phone]           Patient: Valencia Luis                MRN: [id number]       SSN: [id number]  YOB: 1955        AGE: 60 y.o. SEX: female  Body mass index is 33.24 kg/m². PCP: Radha Diaz MD  02/20/20      This office note has been dictated. REVIEW OF SYSTEMS:  Constitutional: Negative for fever, chills, weight loss and malaise/fatigue. HENT: Negative. Eyes: Negative. Respiratory: Negative. Cardiovascular: Negative. Gastrointestinal: No bowel incontinence or constipation. Genitourinary: No bladder incontinence or saddle anesthesia. Skin: Negative. Neurological: Negative. Endo/Heme/Allergies: Negative. Psychiatric/Behavioral: Negative. Musculoskeletal: As per HPI above. Past Medical History:   Diagnosis Date    Arthritis     Fibromyalgia     Hypertension     Thyroid disease          Current Outpatient Medications:     warfarin (COUMADIN) 3 mg tablet, Take 1 Tab by mouth daily for 21 days. , Disp: 30 Tab, Rfl: 1    cephALEXin (KEFLEX) 500 mg capsule, Take 1 Cap by mouth four (4) times daily. , Disp: 8 Cap, Rfl: 0    levothyroxine (SYNTHROID) 150 mcg tablet, Take 150 mcg by mouth Daily (before breakfast). , Disp: , Rfl:     losartan-hydroCHLOROthiazide (HYZAAR) 100-12.5 mg per tablet, Take 1 Tab by mouth daily. , Disp: , Rfl:     amLODIPine (NORVASC) 5 mg tablet, Take 5 mg by mouth daily. , Disp: , Rfl:     Biotin 2,500 mcg cap, Take  by mouth., Disp: , Rfl:     ondansetron hcl (ZOFRAN) 4 mg tablet, Take 1 Tab by mouth every eight (8) hours as needed for Nausea or Vomiting., Disp: 30 Tab, Rfl: 1    cephALEXin (KEFLEX) 500 mg capsule, Take 1 Cap by mouth four (4) times daily. , Disp: 28 Cap, Rfl: 0    docusate sodium (COLACE) 100 mg capsule, Take 1 Cap by mouth two (2) times a day for 90 days. , Disp: 60 Cap, Rfl: 2    Allergies   Allergen Reactions    Aspirin Nausea and Vomiting    Other Medication Not Reported This Time     Sidra    Patient states only allergic to aspirin       Social History     Socioeconomic History    Marital status: UNKNOWN     Spouse name: Not on file    Number of children: Not on file    Years of education: Not on file    Highest education level: Not on file   Occupational History    Not on file   Social Needs    Financial resource strain: Not on file    Food insecurity:     Worry: Not on file     Inability: Not on file    Transportation needs:     Medical: Not on file     Non-medical: Not on file   Tobacco Use    Smoking status: Never Smoker    Smokeless tobacco: Never Used   Substance and Sexual Activity    Alcohol use: Not Currently    Drug use: Never    Sexual activity: Not on file   Lifestyle    Physical activity:     Days per week: Not on file     Minutes per session: Not on file    Stress: Not on file   Relationships    Social connections:     Talks on phone: Not on file     Gets together: Not on file     Attends Voodoo service: Not on file     Active member of club or organization: Not on file     Attends meetings of clubs or organizations: Not on file     Relationship status: Not on file    Intimate partner violence:     Fear of current or ex partner: Not on file     Emotionally abused: Not on file     Physically abused: Not on file     Forced sexual activity: Not on file   Other Topics Concern    Not on file   Social History Narrative    Not on file       Past Surgical History:   Procedure Laterality Date    HX HIP REPLACEMENT      HX HYSTERECTOMY      partial    HX THYROIDECTOMY             We did see Ms. Mcconnell for followup with regards to her right hip replacement. The patient is now 15 days status post surgery and is doing quite well. She is progressing nicely. She did have some serous drainage early on. This has ceased. She was protected with antibiotics during that time.   She has had no fevers, chills, systemic changes, or injuries to report, and no chest pain or shortness of breath. She is receiving home physical therapy without complications. Her pain is well controlled. PHYSICAL EXAMINATION:  In general, the patient is alert and oriented x 3 in no acute distress. The patient is well-developed, well-nourished, with a normal affect. The patient is afebrile. Examination of the right hip reveals the skin is intact. The surgical wounds are healed nicely. The staples are in place. There is no erythema, no ecchymosis, no warmth, and no signs of infection or cellulitis present. There is no underlying fluctuance. There are no signs for hematoma or seroma present. There is no pain with rotation of the hip. Leg lengths look good. There is mild edema distally without calf tenderness. There is negative Madyson's. There is no evidence of DVT present. RADIOGRAPHS:  Radiographs in the office today, including AP of the pelvis, and AP and cross table of the right hip, show the total hip components to be well-fixed with no evidence of loosening or fracture noted. ASSESSMENT:  Status post right hip replacement. PLAN:  At this point, the patient is progressing well. The staples are removed and replaced with Steri-Strips without complications. She will be set up with outpatient physical therapy. We will plan on seeing her back in the office in about three weeks' time for evaluation.                   JR Kamran KUNZ, MAYCOL, ATC

## 2020-02-20 NOTE — TELEPHONE ENCOUNTER
Yuliya, nurse, is asking if we are continuing the Coumadin so she knows if she has to go out and check her Protime today.     [address]

## 2020-02-20 NOTE — TELEPHONE ENCOUNTER
Darshana Pérez Patient's  Home health nurse called and asked if patient will receive her PT, INR & coumadin @ her appt today .      Armando Kevin tel: [phone]

## 2020-02-25 ENCOUNTER — APPOINTMENT (OUTPATIENT)
Dept: PHYSICAL THERAPY | Age: 65
End: 2020-02-25

## 2020-02-27 ENCOUNTER — APPOINTMENT (OUTPATIENT)
Dept: PHYSICAL THERAPY | Age: 65
End: 2020-02-27

## 2020-04-02 ENCOUNTER — OFFICE VISIT (OUTPATIENT)
Dept: ORTHOPEDIC SURGERY | Facility: CLINIC | Age: 65
End: 2020-04-02

## 2020-04-02 VITALS
HEIGHT: 68 IN | DIASTOLIC BLOOD PRESSURE: 108 MMHG | SYSTOLIC BLOOD PRESSURE: 150 MMHG | WEIGHT: 218.6 LBS | BODY MASS INDEX: 33.13 KG/M2 | TEMPERATURE: 96.5 F | RESPIRATION RATE: 18 BRPM | HEART RATE: 99 BPM | OXYGEN SATURATION: 97 %

## 2020-04-02 DIAGNOSIS — Z96.641 STATUS POST RIGHT HIP REPLACEMENT: Primary | ICD-10-CM

## 2020-04-02 NOTE — PROGRESS NOTES
[address] [place] [address]  [phone]           Patient: Eric Jones                MRN: [id number]       SSN: [id number]  YOB: 1955        AGE: 60 y.o. SEX: female  Body mass index is 33.24 kg/m². PCP: Ann Marie MD  04/02/20      This office note has been dictated. REVIEW OF SYSTEMS:  Constitutional: Negative for fever, chills, weight loss and malaise/fatigue. HENT: Negative. Eyes: Negative. Respiratory: Negative. Cardiovascular: Negative. Gastrointestinal: No bowel incontinence or constipation. Genitourinary: No bladder incontinence or saddle anesthesia. Skin: Negative. Neurological: Negative. Endo/Heme/Allergies: Negative. Psychiatric/Behavioral: Negative. Musculoskeletal: As per HPI above. Past Medical History:   Diagnosis Date    Arthritis     Fibromyalgia     Hypertension     Thyroid disease          Current Outpatient Medications:     levothyroxine (SYNTHROID) 150 mcg tablet, Take 150 mcg by mouth Daily (before breakfast). , Disp: , Rfl:     losartan-hydroCHLOROthiazide (HYZAAR) 100-12.5 mg per tablet, Take 1 Tab by mouth daily. , Disp: , Rfl:     Biotin 2,500 mcg cap, Take  by mouth., Disp: , Rfl:     ondansetron hcl (ZOFRAN) 4 mg tablet, Take 1 Tab by mouth every eight (8) hours as needed for Nausea or Vomiting., Disp: 30 Tab, Rfl: 1    cephALEXin (KEFLEX) 500 mg capsule, Take 1 Cap by mouth four (4) times daily. , Disp: 28 Cap, Rfl: 0    docusate sodium (COLACE) 100 mg capsule, Take 1 Cap by mouth two (2) times a day for 90 days. , Disp: 60 Cap, Rfl: 2    cephALEXin (KEFLEX) 500 mg capsule, Take 1 Cap by mouth four (4) times daily. , Disp: 8 Cap, Rfl: 0    amLODIPine (NORVASC) 5 mg tablet, Take 5 mg by mouth daily. , Disp: , Rfl:     Allergies   Allergen Reactions    Aspirin Nausea and Vomiting    Other Medication Not Reported This Time     Spiam    Patient states only allergic to aspirin       Social History     Socioeconomic History    Marital status: UNKNOWN     Spouse name: Not on file    Number of children: Not on file    Years of education: Not on file    Highest education level: Not on file   Occupational History    Not on file   Social Needs    Financial resource strain: Not on file    Food insecurity     Worry: Not on file     Inability: Not on file    Transportation needs     Medical: Not on file     Non-medical: Not on file   Tobacco Use    Smoking status: Never Smoker    Smokeless tobacco: Never Used   Substance and Sexual Activity    Alcohol use: Not Currently    Drug use: Never    Sexual activity: Not on file   Lifestyle    Physical activity     Days per week: Not on file     Minutes per session: Not on file    Stress: Not on file   Relationships    Social connections     Talks on phone: Not on file     Gets together: Not on file     Attends Yazidism service: Not on file     Active member of club or organization: Not on file     Attends meetings of clubs or organizations: Not on file     Relationship status: Not on file    Intimate partner violence     Fear of current or ex partner: Not on file     Emotionally abused: Not on file     Physically abused: Not on file     Forced sexual activity: Not on file   Other Topics Concern    Not on file   Social History Narrative    Not on file       Past Surgical History:   Procedure Laterality Date    HX HIP REPLACEMENT      HX HYSTERECTOMY      partial    HX THYROIDECTOMY                 Patient seen today for evaluation of her right hip. She is now 2 months status post right total hip replacement lateral approach. She is doing extremely well. She did receive home physical therapy and does continue home excise program.  She is had no troubles the wound. Pain is well controlled. She is taking no narcotics. Patient denies recent fevers, chills, chest pain, SOB, or injuries.    No recent systemic changes noted. A 12-point review of systems is performed today. Pertinent positives are noted. All other systems reviewed and otherwise are negative. Physical exam: General: Alert and oriented x3, nad.  well-developed, well nourished. normal affect, AF. NC/AT, EOMI, neck supple, trachea midline, no JVD present. Breathing is non-labored. Examination of the right hip for a skin intact. There is no erythema, ecchymosis no signs of infection or swelling present. There is no pain with rotation of the hip. Minimal discomfort to palpation to the trochanteric bursal region. Leg lengths are perfect. She has negative calf tenderness. No Homans. No signs of DVT present. Assessment: Status post right total hip replacement    Plan: At this point, the patient will continue with home excise program.  She is instructed on abduction exercises. She denies need for analgesics. She will follow-up with us in 3 months time for evaluation. She will call with any questions or concerns arise.         JR Kamran KUNZ, PADishaC, ATC

## 2020-07-02 ENCOUNTER — OFFICE VISIT (OUTPATIENT)
Dept: ORTHOPEDIC SURGERY | Facility: CLINIC | Age: 65
End: 2020-07-02

## 2020-07-02 ENCOUNTER — DOCUMENTATION ONLY (OUTPATIENT)
Dept: ORTHOPEDIC SURGERY | Facility: CLINIC | Age: 65
End: 2020-07-02

## 2020-07-02 VITALS
RESPIRATION RATE: 16 BRPM | TEMPERATURE: 98 F | HEIGHT: 68 IN | OXYGEN SATURATION: 95 % | HEART RATE: 106 BPM | SYSTOLIC BLOOD PRESSURE: 142 MMHG | DIASTOLIC BLOOD PRESSURE: 95 MMHG | WEIGHT: 229.4 LBS | BODY MASS INDEX: 34.77 KG/M2

## 2020-07-02 DIAGNOSIS — Z96.641 STATUS POST RIGHT HIP REPLACEMENT: Primary | ICD-10-CM

## 2020-07-02 DIAGNOSIS — M25.551 RIGHT HIP PAIN: ICD-10-CM

## 2020-07-02 DIAGNOSIS — M70.62 TROCHANTERIC BURSITIS OF LEFT HIP: ICD-10-CM

## 2020-07-02 NOTE — PROGRESS NOTES
Patient dropped off [employer]' form at the [facility] office. Placed in the forms bin at . Fax number and address are on the form.  Please advise patient when completed

## 2020-07-02 NOTE — PROGRESS NOTES
1. Have you been to the ER, urgent care clinic since your last visit? Hospitalized since your last visit? No    2. Have you seen or consulted any other health care providers outside of the [address] [place] [address] since your last visit? Include any pap smears or colon screening.  No

## 2020-07-02 NOTE — PROGRESS NOTES
[address] [place] [address]  [phone]           Patient: Song Quintnailla                MRN: [id number]       SSN: [id number]  YOB: 1955        AGE: 60 y.o. SEX: female  Body mass index is 34.88 kg/m². PCP: Oskar Sheets MD  07/02/20      This office note has been dictated. REVIEW OF SYSTEMS:  Constitutional: Negative for fever, chills, weight loss and malaise/fatigue. HENT: Negative. Eyes: Negative. Respiratory: Negative. Cardiovascular: Negative. Gastrointestinal: No bowel incontinence or constipation. Genitourinary: No bladder incontinence or saddle anesthesia. Skin: Negative. Neurological: Negative. Endo/Heme/Allergies: Negative. Psychiatric/Behavioral: Negative. Musculoskeletal: As per HPI above. Past Medical History:   Diagnosis Date    Arthritis     Fibromyalgia     Hypertension     Thyroid disease          Current Outpatient Medications:     levothyroxine (SYNTHROID) 150 mcg tablet, Take 150 mcg by mouth Daily (before breakfast). , Disp: , Rfl:     losartan-hydroCHLOROthiazide (HYZAAR) 100-12.5 mg per tablet, Take 1 Tab by mouth daily. , Disp: , Rfl:     amLODIPine (NORVASC) 5 mg tablet, Take 5 mg by mouth daily. , Disp: , Rfl:     Biotin 2,500 mcg cap, Take  by mouth., Disp: , Rfl:     ondansetron hcl (ZOFRAN) 4 mg tablet, Take 1 Tab by mouth every eight (8) hours as needed for Nausea or Vomiting., Disp: 30 Tab, Rfl: 1    cephALEXin (KEFLEX) 500 mg capsule, Take 1 Cap by mouth four (4) times daily. , Disp: 28 Cap, Rfl: 0    cephALEXin (KEFLEX) 500 mg capsule, Take 1 Cap by mouth four (4) times daily. , Disp: 8 Cap, Rfl: 0    Allergies   Allergen Reactions    Aspirin Nausea and Vomiting    Other Medication Not Reported This Time     Spiam    Patient states only allergic to aspirin       Social History     Socioeconomic History    Marital status: UNKNOWN     Spouse name: Not on file    Number of children: Not on file    Years of education: Not on file    Highest education level: Not on file   Occupational History    Not on file   Social Needs    Financial resource strain: Not on file    Food insecurity     Worry: Not on file     Inability: Not on file    Transportation needs     Medical: Not on file     Non-medical: Not on file   Tobacco Use    Smoking status: Never Smoker    Smokeless tobacco: Never Used   Substance and Sexual Activity    Alcohol use: Not Currently    Drug use: Never    Sexual activity: Not on file   Lifestyle    Physical activity     Days per week: Not on file     Minutes per session: Not on file    Stress: Not on file   Relationships    Social connections     Talks on phone: Not on file     Gets together: Not on file     Attends Evangelical service: Not on file     Active member of club or organization: Not on file     Attends meetings of clubs or organizations: Not on file     Relationship status: Not on file    Intimate partner violence     Fear of current or ex partner: Not on file     Emotionally abused: Not on file     Physically abused: Not on file     Forced sexual activity: Not on file   Other Topics Concern    Not on file   Social History Narrative    Not on file       Past Surgical History:   Procedure Laterality Date    HX HIP REPLACEMENT      HX HYSTERECTOMY      partial    HX THYROIDECTOMY           Patient seen and evaluated today for her right hip replacement. Patient 5-month status post surgery. She done quite well. She quite have the results of hip replacement. She does continue home exercise program.  She did have a little laterally based discomfort of her left hip. She is currently taking no anti-inflammatories. Patient denies recent fevers, chills, chest pain, SOB, or injuries. No recent systemic changes noted. A 12-point review of systems is performed today. Pertinent positives are noted.   All other systems reviewed and otherwise are negative. Physical exam: General: Alert and oriented x3, nad.  well-developed, well nourished. normal affect, AF. NC/AT, EOMI, neck supple, trachea midline, no JVD present. Breathing is non-labored. Examination of lower extremities reveals pain-free range of motion of the hips. She does have little tenderness palpation of the trochanter bursa the left hip. Negative straight leg raise. Negative calf tenderness. Negative Homans. No signs of DVT present. Leg lengths are even grossly sitting in chair. Abduction strength is 5- on the right 5 out of 5 in left. Assessment: #1 status post right hip replacement, #2 left hip trochanteric bursitis    Plan: At this point we discussed treatment options. She will continue home excise program in regards to the right hip including abduction exercises. For the left hip we discussed cortisone injection for the bursitis and she declines. She will obtain some Voltaren gel to apply to the hip as directed. We will see her back in the office in 6 months time for evaluation and x-ray of the right hip.             JR Kamran KUNZ, PAELIZABETH, ATC

## 2020-07-07 ENCOUNTER — DOCUMENTATION ONLY (OUTPATIENT)
Dept: ORTHOPEDIC SURGERY | Facility: CLINIC | Age: 65
End: 2020-07-07

## 2020-07-07 NOTE — PROGRESS NOTES
Transportation form completed and a copy was placed to be scanned. Contacted patient to inform her I would be mailing her original forms back to her due to needing her signature. She verbalized understanding.

## 2021-01-07 ENCOUNTER — OFFICE VISIT (OUTPATIENT)
Dept: ORTHOPEDIC SURGERY | Age: 66
End: 2021-01-07
Payer: MEDICAID

## 2021-01-07 VITALS
OXYGEN SATURATION: 96 % | HEIGHT: 68 IN | SYSTOLIC BLOOD PRESSURE: 154 MMHG | TEMPERATURE: 96.8 F | HEART RATE: 100 BPM | WEIGHT: 248.8 LBS | BODY MASS INDEX: 37.71 KG/M2 | DIASTOLIC BLOOD PRESSURE: 102 MMHG | RESPIRATION RATE: 16 BRPM

## 2021-01-07 DIAGNOSIS — M25.551 RIGHT HIP PAIN: ICD-10-CM

## 2021-01-07 DIAGNOSIS — M16.12 PRIMARY OSTEOARTHRITIS OF LEFT HIP: ICD-10-CM

## 2021-01-07 DIAGNOSIS — Z96.641 STATUS POST RIGHT HIP REPLACEMENT: Primary | ICD-10-CM

## 2021-01-07 PROCEDURE — 99214 OFFICE O/P EST MOD 30 MIN: CPT | Performed by: PHYSICIAN ASSISTANT

## 2021-01-07 PROCEDURE — 73502 X-RAY EXAM HIP UNI 2-3 VIEWS: CPT | Performed by: PHYSICIAN ASSISTANT

## 2021-01-07 RX ORDER — DICLOFENAC SODIUM 10 MG/G
4 GEL TOPICAL 4 TIMES DAILY
Qty: 100 G | Refills: 4 | Status: SHIPPED | OUTPATIENT
Start: 2021-01-07

## 2021-01-07 NOTE — PROGRESS NOTES
[address] [place] [address]  [phone]           Patient: Georgi Castanon                MRN: [id number]       SSN: [id number]  YOB: 1955        AGE: 63 y.o. SEX: female  Body mass index is 37.83 kg/m². PCP: Ann Dinh MD  01/07/21      This office note has been dictated. REVIEW OF SYSTEMS:  Constitutional: Negative for fever, chills, weight loss and malaise/fatigue. HENT: Negative. Eyes: Negative. Respiratory: Negative. Cardiovascular: Negative. Gastrointestinal: No bowel incontinence or constipation. Genitourinary: No bladder incontinence or saddle anesthesia. Skin: Negative. Neurological: Negative. Endo/Heme/Allergies: Negative. Psychiatric/Behavioral: Negative. Musculoskeletal: As per HPI above. Past Medical History:   Diagnosis Date    Arthritis     Fibromyalgia     Hypertension     Thyroid disease          Current Outpatient Medications:     levothyroxine (SYNTHROID) 150 mcg tablet, Take 150 mcg by mouth Daily (before breakfast). , Disp: , Rfl:     losartan-hydroCHLOROthiazide (HYZAAR) 100-12.5 mg per tablet, Take 1 Tab by mouth daily. , Disp: , Rfl:     amLODIPine (NORVASC) 5 mg tablet, Take 5 mg by mouth daily. , Disp: , Rfl:     Biotin 2,500 mcg cap, Take  by mouth., Disp: , Rfl:     ondansetron hcl (ZOFRAN) 4 mg tablet, Take 1 Tab by mouth every eight (8) hours as needed for Nausea or Vomiting., Disp: 30 Tab, Rfl: 1    cephALEXin (KEFLEX) 500 mg capsule, Take 1 Cap by mouth four (4) times daily. , Disp: 28 Cap, Rfl: 0    cephALEXin (KEFLEX) 500 mg capsule, Take 1 Cap by mouth four (4) times daily. , Disp: 8 Cap, Rfl: 0    Allergies   Allergen Reactions    Aspirin Nausea and Vomiting    Other Medication Not Reported This Time     Spiam    Patient states only allergic to aspirin       Social History     Socioeconomic History    Marital status: UNKNOWN     Spouse name: Not on file    Number of children: Not on file    Years of education: Not on file    Highest education level: Not on file   Occupational History    Not on file   Social Needs    Financial resource strain: Not on file    Food insecurity     Worry: Not on file     Inability: Not on file    Transportation needs     Medical: Not on file     Non-medical: Not on file   Tobacco Use    Smoking status: Never Smoker    Smokeless tobacco: Never Used   Substance and Sexual Activity    Alcohol use: Not Currently    Drug use: Never    Sexual activity: Not on file   Lifestyle    Physical activity     Days per week: Not on file     Minutes per session: Not on file    Stress: Not on file   Relationships    Social connections     Talks on phone: Not on file     Gets together: Not on file     Attends Lutheran service: Not on file     Active member of club or organization: Not on file     Attends meetings of clubs or organizations: Not on file     Relationship status: Not on file    Intimate partner violence     Fear of current or ex partner: Not on file     Emotionally abused: Not on file     Physically abused: Not on file     Forced sexual activity: Not on file   Other Topics Concern    Not on file   Social History Narrative    Not on file       Past Surgical History:   Procedure Laterality Date    HX HIP REPLACEMENT      HX HYSTERECTOMY      partial    HX THYROIDECTOMY             Patient seen evaluated today for bilateral hips. She is status post right total replacement is done well with it. She is quite pleased with results of the replacement. She has a little laterally based discomfort of the right hip. She also has known advanced arthritis of the left hip. She is not ready for surgical intervention. She has had no recent fevers or chills, systemic changes, injuries to report. Patient denies recent fevers, chills, chest pain, SOB, or injuries. No recent systemic changes noted.   A 12-point review of systems is performed today. Pertinent positives are noted. All other systems reviewed and otherwise are negative. Physical exam: General: Alert and oriented x3, nad.  well-developed, well nourished. normal affect, AF. NC/AT, EOMI, neck supple, trachea midline, no JVD present. Breathing is non-labored. Examination of lower extremities reveals pain-free range of motion the right hip. She does have tenderness palpation trochanter bursa on the right side. She has decreased range of motion the left hip. Reproducing groin and thigh discomfort. Negative straight leg raise. Negative calf tenderness. Negative Homans. No signs of DVT present. Radiographs obtained the office today 1/7/2021 at the [place] location including AP pelvis, AP and crosstable lateral right hip shows the S-ROM total hip replacement to be well fixed without evidence for loosening or fracture noted. The left hip does confirm end-stage arthritis. Assessment: #1 status post right hip replacement, #2 right hip trochanter bursitis, #3 left hip end-stage arthritis    Plan: At this point, we discussed treatment options. She declines a cortisone injection for the bursitis as well as physical therapy. She is not ready to move forward with surgical intervention for the left hip. We will try Voltaren gel for the bursitis on her right side. She will see us back in 3 months time for evaluation.             JR Kamran KUNZ, MAYCOL, ATC

## 2021-05-06 ENCOUNTER — OFFICE VISIT (OUTPATIENT)
Dept: ORTHOPEDIC SURGERY | Age: 66
End: 2021-05-06
Payer: MEDICAID

## 2021-05-06 VITALS
HEIGHT: 68 IN | TEMPERATURE: 97.7 F | BODY MASS INDEX: 36.68 KG/M2 | WEIGHT: 242 LBS | HEART RATE: 100 BPM | RESPIRATION RATE: 19 BRPM | OXYGEN SATURATION: 98 %

## 2021-05-06 DIAGNOSIS — M16.12 PRIMARY OSTEOARTHRITIS OF LEFT HIP: ICD-10-CM

## 2021-05-06 DIAGNOSIS — Z96.641 STATUS POST RIGHT HIP REPLACEMENT: Primary | ICD-10-CM

## 2021-05-06 DIAGNOSIS — M25.551 RIGHT HIP PAIN: ICD-10-CM

## 2021-05-06 PROCEDURE — 99214 OFFICE O/P EST MOD 30 MIN: CPT | Performed by: PHYSICIAN ASSISTANT

## 2021-05-06 NOTE — PROGRESS NOTES
[address] [facility], [address] [facility]  [phone]           Patient: Eliz Serrato                MRN: [id number]       SSN: [id number]  YOB: 1955        AGE: 63 y.o. SEX: female  Body mass index is 36.8 kg/m². PCP: Richard Mercedes MD  05/06/21      This office note has been dictated. REVIEW OF SYSTEMS:  Constitutional: Negative for fever, chills, weight loss and malaise/fatigue. HENT: Negative. Eyes: Negative. Respiratory: Negative. Cardiovascular: Negative. Gastrointestinal: No bowel incontinence or constipation. Genitourinary: No bladder incontinence or saddle anesthesia. Skin: Negative. Neurological: Negative. Endo/Heme/Allergies: Negative. Psychiatric/Behavioral: Negative. Musculoskeletal: As per HPI above. Past Medical History:   Diagnosis Date    Arthritis     Fibromyalgia     Hypertension     Thyroid disease          Current Outpatient Medications:     diclofenac (VOLTAREN) 1 % gel, Apply 4 g to affected area four (4) times daily. , Disp: 100 g, Rfl: 4    ondansetron hcl (ZOFRAN) 4 mg tablet, Take 1 Tab by mouth every eight (8) hours as needed for Nausea or Vomiting., Disp: 30 Tab, Rfl: 1    cephALEXin (KEFLEX) 500 mg capsule, Take 1 Cap by mouth four (4) times daily. , Disp: 28 Cap, Rfl: 0    cephALEXin (KEFLEX) 500 mg capsule, Take 1 Cap by mouth four (4) times daily. , Disp: 8 Cap, Rfl: 0    levothyroxine (SYNTHROID) 150 mcg tablet, Take 150 mcg by mouth Daily (before breakfast). , Disp: , Rfl:     losartan-hydroCHLOROthiazide (HYZAAR) 100-12.5 mg per tablet, Take 1 Tab by mouth daily. , Disp: , Rfl:     amLODIPine (NORVASC) 5 mg tablet, Take 5 mg by mouth daily. , Disp: , Rfl:     Biotin 2,500 mcg cap, Take  by mouth., Disp: , Rfl:     Allergies   Allergen Reactions    Aspirin Nausea and Vomiting    Other Medication Not Reported This Time     Spiam    Patient states only allergic to aspirin       Social History     Socioeconomic History    Marital status: UNKNOWN     Spouse name: Not on file    Number of children: Not on file    Years of education: Not on file    Highest education level: Not on file   Occupational History    Not on file   Social Needs    Financial resource strain: Not on file    Food insecurity     Worry: Not on file     Inability: Not on file    Transportation needs     Medical: Not on file     Non-medical: Not on file   Tobacco Use    Smoking status: Never Smoker    Smokeless tobacco: Never Used   Substance and Sexual Activity    Alcohol use: Not Currently    Drug use: Never    Sexual activity: Not on file   Lifestyle    Physical activity     Days per week: Not on file     Minutes per session: Not on file    Stress: Not on file   Relationships    Social connections     Talks on phone: Not on file     Gets together: Not on file     Attends Tenriism service: Not on file     Active member of club or organization: Not on file     Attends meetings of clubs or organizations: Not on file     Relationship status: Not on file    Intimate partner violence     Fear of current or ex partner: Not on file     Emotionally abused: Not on file     Physically abused: Not on file     Forced sexual activity: Not on file   Other Topics Concern    Not on file   Social History Narrative    Not on file       Past Surgical History:   Procedure Laterality Date    HX HIP REPLACEMENT      HX HYSTERECTOMY      partial    HX THYROIDECTOMY           Patient seen and evaluated today for her right total hip replacement. She is now approximate 3-month status post surgery and she has done well. She is quite pleased with the results. She is had no troubles the wound. She has finished up her outpatient physical therapy. She does have known end-stage arthritis of the left hip however she is not ready for surgery at this time. She ambulates with a walker.   She does continue with home exercise program on occasion. Patient denies recent fevers, chills, chest pain, SOB, or injuries. No recent systemic changes noted. A 12-point review of systems is performed today. Pertinent positives are noted. All other systems reviewed and otherwise are negative. Physical exam: General: Alert and oriented x3, nad.  well-developed, well nourished. normal affect, AF. NC/AT, EOMI, neck supple, trachea midline, no JVD present. Breathing is non-labored. Examination of lower extremities reveals decreased range of motion left hip with reproduction of groin and thigh discomfort. The right hip moves well. Leg lengths look good. Abduction strength is 4+ on the left and 5 out of 5 on the right. There is negative calf tenderness. Negative Homans. No signs of DVT present. Assessment: #1 status post right total hip replacement, #2 left hip end-stage arthritis    Plan: At this point, she is instructed on home exercise program and abduction exercises. We discussed surgical intervention regarding her left hip and we will continue with conservative treatment for the time being as she is still recovering from her right hip and not ready for surgical intervention. We will see her back in 3 months time for evaluation. She will call with any questions or concerns arise.             JR Kamran KUNZ, MAYCOL, ATC

## 2021-08-19 ENCOUNTER — OFFICE VISIT (OUTPATIENT)
Dept: ORTHOPEDIC SURGERY | Age: 66
End: 2021-08-19
Payer: MEDICAID

## 2021-08-19 VITALS
WEIGHT: 246 LBS | TEMPERATURE: 96.9 F | OXYGEN SATURATION: 96 % | BODY MASS INDEX: 37.28 KG/M2 | HEIGHT: 68 IN | HEART RATE: 99 BPM

## 2021-08-19 DIAGNOSIS — M16.12 PRIMARY OSTEOARTHRITIS OF LEFT HIP: ICD-10-CM

## 2021-08-19 DIAGNOSIS — Z96.641 STATUS POST RIGHT HIP REPLACEMENT: Primary | ICD-10-CM

## 2021-08-19 DIAGNOSIS — M25.551 RIGHT HIP PAIN: ICD-10-CM

## 2021-08-19 PROCEDURE — 99213 OFFICE O/P EST LOW 20 MIN: CPT | Performed by: PHYSICIAN ASSISTANT

## 2021-08-19 NOTE — PROGRESS NOTES
[address] [facility], [address] [facility]  [phone]           Patient: Guillaume Person                MRN: [id number]       SSN: [id number]  YOB: 1955        AGE: 63 y.o. SEX: female  Body mass index is 37.4 kg/m². PCP: Kaylene Rios MD  08/19/21      This office note has been dictated. REVIEW OF SYSTEMS:  Constitutional: Negative for fever, chills, weight loss and malaise/fatigue. HENT: Negative. Eyes: Negative. Respiratory: Negative. Cardiovascular: Negative. Gastrointestinal: No bowel incontinence or constipation. Genitourinary: No bladder incontinence or saddle anesthesia. Skin: Negative. Neurological: Negative. Endo/Heme/Allergies: Negative. Psychiatric/Behavioral: Negative. Musculoskeletal: As per HPI above. Past Medical History:   Diagnosis Date    Arthritis     Fibromyalgia     Hypertension     Thyroid disease          Current Outpatient Medications:     fish oil-omega-3 fatty acids 340-1,000 mg capsule, Take 1 Capsule by mouth daily. , Disp: , Rfl:     OTHER, Hair, skin, nails vitamin, Disp: , Rfl:     levothyroxine (SYNTHROID) 150 mcg tablet, Take 150 mcg by mouth Daily (before breakfast). , Disp: , Rfl:     losartan-hydroCHLOROthiazide (HYZAAR) 100-12.5 mg per tablet, Take 1 Tab by mouth daily. , Disp: , Rfl:     amLODIPine (NORVASC) 5 mg tablet, Take 5 mg by mouth daily. , Disp: , Rfl:     Biotin 2,500 mcg cap, Take  by mouth., Disp: , Rfl:     diclofenac (VOLTAREN) 1 % gel, Apply 4 g to affected area four (4) times daily. (Patient not taking: Reported on 8/19/2021), Disp: 100 g, Rfl: 4    ondansetron hcl (ZOFRAN) 4 mg tablet, Take 1 Tab by mouth every eight (8) hours as needed for Nausea or Vomiting. (Patient not taking: Reported on 8/19/2021), Disp: 30 Tab, Rfl: 1    cephALEXin (KEFLEX) 500 mg capsule, Take 1 Cap by mouth four (4) times daily.  (Patient not taking: Reported on 8/19/2021), Disp: 28 Cap, Rfl: 0    cephALEXin (KEFLEX) 500 mg capsule, Take 1 Cap by mouth four (4) times daily. (Patient not taking: Reported on 8/19/2021), Disp: 8 Cap, Rfl: 0    Allergies   Allergen Reactions    Aspirin Nausea and Vomiting    Other Medication Not Reported This Time     Spiam    Patient states only allergic to aspirin       Social History     Socioeconomic History    Marital status: UNKNOWN     Spouse name: Not on file    Number of children: Not on file    Years of education: Not on file    Highest education level: Not on file   Occupational History    Not on file   Tobacco Use    Smoking status: Never Smoker    Smokeless tobacco: Never Used   Substance and Sexual Activity    Alcohol use: Not Currently    Drug use: Never    Sexual activity: Not on file   Other Topics Concern    Not on file   Social History Narrative    Not on file     Social Determinants of Health     Financial Resource Strain:     Difficulty of Paying Living Expenses:    Food Insecurity:     Worried About [address] in the Last Year:     [address] in the Last Year:    Transportation Needs:     Lack of Transportation (Medical):      Lack of Transportation (Non-Medical):    Physical Activity:     Days of Exercise per Week:     Minutes of Exercise per Session:    Stress:     Feeling of Stress :    Social Connections:     Frequency of Communication with Friends and Family:     Frequency of Social Gatherings with Friends and Family:     Attends Restoration Services:     Active Member of Clubs or Organizations:     Attends Club or Organization Meetings:     Marital Status:    Intimate Partner Violence:     Fear of Current or Ex-Partner:     Emotionally Abused:     Physically Abused:     Sexually Abused:        Past Surgical History:   Procedure Laterality Date    HX HIP REPLACEMENT      HX HYSTERECTOMY      partial    HX THYROIDECTOMY               Patient seen evaluate today for her right total hip replacement. She has now approximate 6-month status post surgery and overall she is doing very well. She very pleased with results of the placement. Does have a little lateral base discomfort worse when she is ambulating or lying on the right side at night. She does continue with home exercise program and were working on her abduction exercises. Patient denies recent fevers, chills, chest pain, SOB, or injuries. No recent systemic changes noted. A 12-point review of systems is performed today. Pertinent positives are noted. All other systems reviewed and otherwise are negative. Physical exam: General: Alert and oriented x3, nad.  well-developed, well nourished. normal affect, AF. NC/AT, EOMI, neck supple, trachea midline, no JVD present. Breathing is non-labored. Examination of lower extremities reveals pain-free range of motion of the hips. There are some slight tenderness palpation joint worse on the right side. Negative straight leg raise. Negative calf tenderness. Negative Homans. No signs of DVT present. Leg lengths are perfect. Abduction strength is 5 - on the right and 5 out of 5 on left. Assessment: #1 status post right total hip replacement, #2 right hip trochanter bursitis mild    Plan: At this point, she will continue activities as tolerated. She will continue with home exercise program and abduction exercises. We discussed Voltaren gel which she declines. We discussed a cortisone injection for the hip which she declines. We will see her back in 6 months time for reevaluation and x-rays of the right hip.           JR Kamran KUNZ, MAYCOL, ATC

## 2021-09-24 ENCOUNTER — TRANSCRIBE ORDER (OUTPATIENT)
Dept: SCHEDULING | Age: 66
End: 2021-09-24

## 2021-09-24 DIAGNOSIS — Z12.31 VISIT FOR SCREENING MAMMOGRAM: Primary | ICD-10-CM

## 2021-11-15 ENCOUNTER — TRANSCRIBE ORDER (OUTPATIENT)
Dept: SCHEDULING | Age: 66
End: 2021-11-15

## 2021-11-15 DIAGNOSIS — R00.0 TACHYCARDIA: Primary | ICD-10-CM

## 2021-12-28 ENCOUNTER — HOSPITAL ENCOUNTER (OUTPATIENT)
Dept: NON INVASIVE DIAGNOSTICS | Age: 66
Discharge: HOME | End: 2021-12-28
Attending: FAMILY MEDICINE
Payer: MEDICAID

## 2021-12-28 VITALS
SYSTOLIC BLOOD PRESSURE: 154 MMHG | BODY MASS INDEX: 37.28 KG/M2 | DIASTOLIC BLOOD PRESSURE: 102 MMHG | HEIGHT: 68 IN | WEIGHT: 246 LBS

## 2021-12-28 DIAGNOSIS — R00.0 TACHYCARDIA: ICD-10-CM

## 2021-12-28 LAB
ECHO LA VOL 2C: 53 ML (ref 22–52)
ECHO LA VOL 4C: 53 ML (ref 22–52)
ECHO LA VOLUME AREA LENGTH: 55 ML
ECHO LA VOLUME INDEX A2C: 24 ML/M2 (ref 16–34)
ECHO LA VOLUME INDEX A4C: 24 ML/M2 (ref 16–34)
ECHO LA VOLUME INDEX AREA LENGTH: 25 ML/M2 (ref 16–34)

## 2021-12-28 PROCEDURE — 93306 TTE W/DOPPLER COMPLETE: CPT

## 2021-12-28 PROCEDURE — 93306 TTE W/DOPPLER COMPLETE: CPT | Performed by: INTERNAL MEDICINE

## 2022-02-23 ENCOUNTER — OFFICE VISIT (OUTPATIENT)
Dept: ORTHOPEDIC SURGERY | Age: 67
End: 2022-02-23
Payer: MEDICAID

## 2022-02-23 VITALS
BODY MASS INDEX: 35.22 KG/M2 | WEIGHT: 232.4 LBS | HEART RATE: 120 BPM | HEIGHT: 68 IN | TEMPERATURE: 96.8 F | OXYGEN SATURATION: 94 %

## 2022-02-23 DIAGNOSIS — M25.551 CHRONIC RIGHT HIP PAIN: Primary | ICD-10-CM

## 2022-02-23 DIAGNOSIS — G89.29 CHRONIC RIGHT HIP PAIN: Primary | ICD-10-CM

## 2022-02-23 DIAGNOSIS — M54.50 LUMBAR PAIN: ICD-10-CM

## 2022-02-23 PROCEDURE — 73502 X-RAY EXAM HIP UNI 2-3 VIEWS: CPT | Performed by: PHYSICIAN ASSISTANT

## 2022-02-23 PROCEDURE — 99213 OFFICE O/P EST LOW 20 MIN: CPT | Performed by: PHYSICIAN ASSISTANT

## 2022-02-23 RX ORDER — IBUPROFEN 200 MG
200 TABLET ORAL
COMMUNITY

## 2022-02-23 RX ORDER — ZINC GLUCONATE 50 MG
1000 TABLET ORAL DAILY
COMMUNITY

## 2022-02-23 RX ORDER — LISINOPRIL 20 MG/1
20 TABLET ORAL DAILY
COMMUNITY

## 2022-02-23 RX ORDER — METOPROLOL SUCCINATE 25 MG/1
25 TABLET, EXTENDED RELEASE ORAL DAILY
COMMUNITY
Start: 2022-02-09

## 2022-02-23 RX ORDER — FLUTICASONE PROPIONATE AND SALMETEROL 100; 50 UG/1; UG/1
POWDER RESPIRATORY (INHALATION)
COMMUNITY
Start: 2021-07-29

## 2022-02-23 RX ORDER — ALBUTEROL SULFATE 90 UG/1
AEROSOL, METERED RESPIRATORY (INHALATION)
COMMUNITY
Start: 2021-07-29

## 2022-02-23 RX ORDER — FERROUS SULFATE, DRIED 160(50) MG
1 TABLET, EXTENDED RELEASE ORAL DAILY
COMMUNITY

## 2022-02-23 NOTE — PROGRESS NOTES
[address] [facility], [address] [facility]  [phone]           Patient: Venkata Patton                MRN: [id number]       SSN: [id number]  YOB: 1955        AGE: 61 y.o. SEX: female  Body mass index is 35.34 kg/m². PCP: Mike Sotelo MD  02/23/22      This office note has been dictated. REVIEW OF SYSTEMS:  Constitutional: Negative for fever, chills, weight loss and malaise/fatigue. HENT: Negative. Eyes: Negative. Respiratory: Negative. Cardiovascular: Negative. Gastrointestinal: No bowel incontinence or constipation. Genitourinary: No bladder incontinence or saddle anesthesia. Skin: Negative. Neurological: Negative. Endo/Heme/Allergies: Negative. Psychiatric/Behavioral: Negative. Musculoskeletal: As per HPI above. Past Medical History:   Diagnosis Date    Arthritis     Fibromyalgia     Hypertension     Thyroid disease          Current Outpatient Medications:     albuterol (Ventolin HFA) 90 mcg/actuation inhaler, Take  by inhalation. , Disp: , Rfl:     calcium-vitamin D (OS-RANULFO +D3) 500 mg-200 unit per tablet, Take 1 Tablet by mouth daily. , Disp: , Rfl:     fluticasone propion-salmeteroL (ADVAIR/WIXELA) 100-50 mcg/dose diskus inhaler, Take  by inhalation. , Disp: , Rfl:     ibuprofen (MOTRIN) 200 mg tablet, Take 200 mg by mouth every six (6) hours as needed. , Disp: , Rfl:     lisinopriL (PRINIVIL, ZESTRIL) 20 mg tablet, Take 20 mg by mouth daily. , Disp: , Rfl:     Magnesium Oxide 500 mg cap, Take 1,000 mg by mouth daily. , Disp: , Rfl:     metoprolol succinate (TOPROL-XL) 25 mg XL tablet, Take 25 mg by mouth daily. , Disp: , Rfl:     fish oil-omega-3 fatty acids 340-1,000 mg capsule, Take 1 Capsule by mouth daily. , Disp: , Rfl:     OTHER, Hair, skin, nails vitamin, Disp: , Rfl:     diclofenac (VOLTAREN) 1 % gel, Apply 4 g to affected area four (4) times daily. , Disp: 100 g, Rfl: 4   ondansetron hcl (ZOFRAN) 4 mg tablet, Take 1 Tab by mouth every eight (8) hours as needed for Nausea or Vomiting., Disp: 30 Tab, Rfl: 1    levothyroxine (SYNTHROID) 150 mcg tablet, Take 150 mcg by mouth Daily (before breakfast). , Disp: , Rfl:     losartan-hydroCHLOROthiazide (HYZAAR) 100-12.5 mg per tablet, Take 1 Tab by mouth daily. , Disp: , Rfl:     amLODIPine (NORVASC) 5 mg tablet, Take 5 mg by mouth daily. , Disp: , Rfl:     Biotin 2,500 mcg cap, Take  by mouth., Disp: , Rfl:     Allergies   Allergen Reactions    Aspirin Nausea and Vomiting    Other Medication Not Reported This Time     Spiam    Patient states only allergic to aspirin       Social History     Socioeconomic History    Marital status: SINGLE     Spouse name: Not on file    Number of children: Not on file    Years of education: Not on file    Highest education level: Not on file   Occupational History    Not on file   Tobacco Use    Smoking status: Never Smoker    Smokeless tobacco: Never Used   Substance and Sexual Activity    Alcohol use: Not Currently    Drug use: Never    Sexual activity: Not on file   Other Topics Concern    Not on file   Social History Narrative    Not on file     Social Determinants of Health     Financial Resource Strain:     Difficulty of Paying Living Expenses: Not on file   Food Insecurity:     Worried About Running Out of Food in the Last Year: Not on file    Cyndi of Food in the Last Year: Not on file   Transportation Needs:     Lack of Transportation (Medical): Not on file    Lack of Transportation (Non-Medical):  Not on file   Physical Activity:     Days of Exercise per Week: Not on file    Minutes of Exercise per Session: Not on file   Stress:     Feeling of Stress : Not on file   Social Connections:     Frequency of Communication with Friends and Family: Not on file    Frequency of Social Gatherings with Friends and Family: Not on file    Attends Synagogue Services: Not on file   [facility] Active Member of Clubs or Organizations: Not on file    Attends Club or Organization Meetings: Not on file    Marital Status: Not on file   Intimate Partner Violence:     Fear of Current or Ex-Partner: Not on file    Emotionally Abused: Not on file    Physically Abused: Not on file    Sexually Abused: Not on file   Housing Stability:     Unable to Pay for Housing in the Last Year: Not on file    Number of [place] in the Last Year: Not on file    Unstable Housing in the Last Year: Not on file       Past Surgical History:   Procedure Laterality Date    HX HIP REPLACEMENT      HX HYSTERECTOMY      partial    HX THYROIDECTOMY             Patient seen evaluate today for her right hip. She is status post right total hip replacement surgery which is done back in 2020. She did have a fall back in November. She had a little bit of discomfort in her hip since that time. Is mainly laterally based. Denies any groin pain or thigh pain. She has no radiating pain down the lower extremities. Does report some stiffness in her back. No change in bowel or bladder habits. Patient denies recent fevers, chills, chest pain, SOB, or injuries. No recent systemic changes noted. A 12-point review of systems is performed today. Pertinent positives are noted. All other systems reviewed and otherwise are negative. Exam: General: Alert and oriented x3, nad.  well-developed, well nourished. normal affect, AF. NC/AT, EOMI, neck supple, trachea midline, no JVD present. Breathing is non-labored. Examination of the lower extremities reveals pain-free range of motion of the right hip. There are some tenderness to palpation trochanter bursa. The left hip moves pretty well however has decreased range of motion. There is some groin discomfort with ROM. Negative straight leg raise. Negative calf tenderness. Negative Homans. No signs of DVT present. Abduction strength is symmetric bilaterally.     Radiographs obtained in the office today 2/23/2022 at the [facility] location include AP pelvis, AP and crosstable of the right hip shows a total hip components to be well fixed without evidence for loosening or fracture noted. Assessment: #1 status post right total hip replacement, #2 right hip trochanter bursitis, #3 low back pain    Plan: At this point, we discussed treatment options. We discussed cortisone injections which she declines. We discussed anti-inflammatory gel. We will place a referral for the spine center due to her back issues. We will see her back in a years time for evaluation or sooner if necessary.             JR Kamran KUNZ, PA-C, ATC

## (undated) DEVICE — BLANKET WRM AD W50XL85.8IN PACU FULL BODY FORC AIR

## (undated) DEVICE — INTENDED FOR TISSUE SEPARATION, AND OTHER PROCEDURES THAT REQUIRE A SHARP SURGICAL BLADE TO PUNCTURE OR CUT.: Brand: BARD-PARKER SAFETY BLADES SIZE 15, STERILE

## (undated) DEVICE — 4-PORT MANIFOLD: Brand: NEPTUNE 2

## (undated) DEVICE — GOWN,REINFORCED,POLY,AURORA,XXLARGE,STR: Brand: MEDLINE

## (undated) DEVICE — 3M™ STERI-DRAPE™ U-DRAPE 1015: Brand: STERI-DRAPE™

## (undated) DEVICE — INTENDED FOR TISSUE SEPARATION, AND OTHER PROCEDURES THAT REQUIRE A SHARP SURGICAL BLADE TO PUNCTURE OR CUT.: Brand: BARD-PARKER SAFETY BLADES SIZE 10, STERILE

## (undated) DEVICE — GOWN,REINF,POLY,ECL,PP SLV,3XL,XLONG: Brand: MEDLINE

## (undated) DEVICE — SUTURE MCRYL SZ 2-0 L36IN ABSRB UD L36MM CT-1 1/2 CIR Y945H

## (undated) DEVICE — LINER ACET OD52MM ID36MM HIP ALTRX PINN: Type: IMPLANTABLE DEVICE | Site: HIP | Status: NON-FUNCTIONAL

## (undated) DEVICE — NEEDLE HYPO 21GA L1.5IN INTRAMUSCULAR S STL LATCH BVL UP

## (undated) DEVICE — STRYKER PERFORMANCE SERIES SAGITTAL BLADE: Brand: STRYKER PERFORMANCE SERIES

## (undated) DEVICE — HOOD, PEEL-AWAY: Brand: FLYTE

## (undated) DEVICE — PENCIL ES L3M BTTN SWCH S STL HEX LOK BLDE ELECTRD HOLSTER

## (undated) DEVICE — Device

## (undated) DEVICE — BIPOLAR SEALER 23-112-1 AQM 6.0: Brand: AQUAMANTYS ®

## (undated) DEVICE — Z DISCONTINUED BY MEDLINE USE 2711682 TRAY SKIN PREP DRY W/ PREM GLV

## (undated) DEVICE — BLANKET WRM W29.9XL79.1IN UP BODY FORC AIR MISTRAL-AIR

## (undated) DEVICE — SYR LR LCK 1ML GRAD NSAF 30ML --

## (undated) DEVICE — X-RAY SPONGES,12 PLY: Brand: DERMACEA

## (undated) DEVICE — HIP PILLOW, ABDUCTION: Brand: DEROYAL

## (undated) DEVICE — REM POLYHESIVE ADULT PATIENT RETURN ELECTRODE: Brand: VALLEYLAB

## (undated) DEVICE — NEEDLE SPNL 22GA L3.5IN BLK HUB S STL REG WALL FIT STYL W/

## (undated) DEVICE — SYRINGE MED 3ML NDL 22GA L1 1/2IN REG BVL SFGLDE

## (undated) DEVICE — 3M™ STERI-DRAPE™ INSTRUMENT POUCH 1018: Brand: STERI-DRAPE™

## (undated) DEVICE — KIT CLN UP BON SECOURS MARYV

## (undated) DEVICE — SUTURE VCRL SZ 2 L27IN ABSRB VLT L65MM TP-1 1/2 CIR J649G

## (undated) DEVICE — PREP SKN CHLRAPRP 26ML TNT -- CONVERT TO ITEM 373320

## (undated) DEVICE — SKIN MARKER,REGULAR TIP WITH RULER AND LABELS: Brand: DEVON

## (undated) DEVICE — SOLUTION IV 1000ML 0.9% SOD CHL

## (undated) DEVICE — SPIROMETER INCENT 2500ML W ONE W VLV

## (undated) DEVICE — SOLUTION IRRIG 3000ML LAC R FLX CONT

## (undated) DEVICE — HANDPIECE SET WITH HIGH FLOW TIP AND SUCTION TUBE: Brand: INTERPULSE

## (undated) DEVICE — NEEDLE HYPO 18GA L1.5IN PNK S STL HUB POLYPR SHLD REG BVL

## (undated) DEVICE — PACK PROCEDURE SURG TOT HIP BSHR LF

## (undated) DEVICE — Z DISCONTINUED USE 2744636  DRESSING AQUACEL 14 IN ALG W3.5XL14IN POLYUR FLM CVR W/ HYDRCOLL

## (undated) DEVICE — SUTURE VCRL SZ 1 L27IN ABSRB VLT CTX L48MM 1 2 CIR SGL ARMED J365H

## (undated) DEVICE — SUTURE VCRL SZ 0 L27IN ABSRB UD L36MM CT-1 1/2 CIR J260H